# Patient Record
Sex: FEMALE | Race: WHITE | NOT HISPANIC OR LATINO | URBAN - METROPOLITAN AREA
[De-identification: names, ages, dates, MRNs, and addresses within clinical notes are randomized per-mention and may not be internally consistent; named-entity substitution may affect disease eponyms.]

---

## 2017-02-28 ENCOUNTER — APPOINTMENT (OUTPATIENT)
Dept: URBAN - METROPOLITAN AREA CLINIC 200 | Age: 67
Setting detail: DERMATOLOGY
End: 2017-03-03

## 2017-02-28 DIAGNOSIS — L57.8 OTHER SKIN CHANGES DUE TO CHRONIC EXPOSURE TO NONIONIZING RADIATION: ICD-10-CM

## 2017-02-28 DIAGNOSIS — D22 MELANOCYTIC NEVI: ICD-10-CM

## 2017-02-28 DIAGNOSIS — Z85.828 PERSONAL HISTORY OF OTHER MALIGNANT NEOPLASM OF SKIN: ICD-10-CM

## 2017-02-28 DIAGNOSIS — L57.0 ACTINIC KERATOSIS: ICD-10-CM

## 2017-02-28 PROBLEM — D22.5 MELANOCYTIC NEVI OF TRUNK: Status: ACTIVE | Noted: 2017-02-28

## 2017-02-28 PROCEDURE — 17000 DESTRUCT PREMALG LESION: CPT

## 2017-02-28 PROCEDURE — 99213 OFFICE O/P EST LOW 20 MIN: CPT | Mod: 25

## 2017-02-28 PROCEDURE — OTHER COUNSELING: OTHER

## 2017-02-28 PROCEDURE — OTHER LIQUID NITROGEN: OTHER

## 2017-02-28 PROCEDURE — OTHER MIPS QUALITY: OTHER

## 2017-02-28 PROCEDURE — 17003 DESTRUCT PREMALG LES 2-14: CPT

## 2017-02-28 ASSESSMENT — LOCATION DETAILED DESCRIPTION DERM
LOCATION DETAILED: LEFT INFERIOR MEDIAL MALAR CHEEK
LOCATION DETAILED: UPPER STERNUM
LOCATION DETAILED: LEFT SUPERIOR MEDIAL FOREHEAD
LOCATION DETAILED: LEFT MEDIAL FRONTAL SCALP
LOCATION DETAILED: RIGHT SUPERIOR PARIETAL SCALP

## 2017-02-28 ASSESSMENT — LOCATION SIMPLE DESCRIPTION DERM
LOCATION SIMPLE: LEFT CHEEK
LOCATION SIMPLE: LEFT SCALP
LOCATION SIMPLE: CHEST
LOCATION SIMPLE: LEFT FOREHEAD
LOCATION SIMPLE: SCALP

## 2017-02-28 ASSESSMENT — LOCATION ZONE DERM
LOCATION ZONE: FACE
LOCATION ZONE: SCALP
LOCATION ZONE: TRUNK

## 2017-02-28 NOTE — PROCEDURE: LIQUID NITROGEN
Detail Level: Detailed
Post-Care Instructions: I reviewed with the patient in detail post-care instructions. Patient is to wear sunprotection, and avoid picking at any of the treated lesions. Pt may apply Vaseline to crusted or scabbing areas.
Number Of Freeze-Thaw Cycles: 2 freeze-thaw cycles
Duration Of Freeze Thaw-Cycle (Seconds): 10
Render Post-Care Instructions In Note?: no
Consent: The patient's consent was obtained including but not limited to risks of crusting, scabbing, blistering, scarring, darker or lighter pigmentary change, recurrence, incomplete removal and infection.

## 2017-08-14 ENCOUNTER — APPOINTMENT (OUTPATIENT)
Dept: URBAN - METROPOLITAN AREA CLINIC 200 | Age: 67
Setting detail: DERMATOLOGY
End: 2017-08-15

## 2017-08-14 DIAGNOSIS — Z85.828 PERSONAL HISTORY OF OTHER MALIGNANT NEOPLASM OF SKIN: ICD-10-CM

## 2017-08-14 DIAGNOSIS — L57.8 OTHER SKIN CHANGES DUE TO CHRONIC EXPOSURE TO NONIONIZING RADIATION: ICD-10-CM

## 2017-08-14 DIAGNOSIS — L57.0 ACTINIC KERATOSIS: ICD-10-CM

## 2017-08-14 PROCEDURE — 17003 DESTRUCT PREMALG LES 2-14: CPT

## 2017-08-14 PROCEDURE — 99213 OFFICE O/P EST LOW 20 MIN: CPT | Mod: 25

## 2017-08-14 PROCEDURE — OTHER COUNSELING: OTHER

## 2017-08-14 PROCEDURE — 17000 DESTRUCT PREMALG LESION: CPT

## 2017-08-14 PROCEDURE — OTHER LIQUID NITROGEN: OTHER

## 2017-08-14 ASSESSMENT — LOCATION SIMPLE DESCRIPTION DERM
LOCATION SIMPLE: RIGHT CHEEK
LOCATION SIMPLE: LEFT FOREHEAD
LOCATION SIMPLE: LOWER BACK
LOCATION SIMPLE: NOSE
LOCATION SIMPLE: RIGHT FOREHEAD
LOCATION SIMPLE: LEFT CHEEK

## 2017-08-14 ASSESSMENT — LOCATION DETAILED DESCRIPTION DERM
LOCATION DETAILED: LEFT SUPERIOR NASAL CHEEK
LOCATION DETAILED: LEFT SUPERIOR LATERAL MALAR CHEEK
LOCATION DETAILED: NASAL ROOT
LOCATION DETAILED: SUPERIOR LUMBAR SPINE
LOCATION DETAILED: LEFT MEDIAL FOREHEAD
LOCATION DETAILED: RIGHT SUPERIOR LATERAL FOREHEAD
LOCATION DETAILED: RIGHT SUPERIOR LATERAL MALAR CHEEK

## 2017-08-14 ASSESSMENT — LOCATION ZONE DERM
LOCATION ZONE: TRUNK
LOCATION ZONE: FACE
LOCATION ZONE: NOSE

## 2017-08-14 NOTE — PROCEDURE: LIQUID NITROGEN
Duration Of Freeze Thaw-Cycle (Seconds): 10
Detail Level: Detailed
Render Post-Care Instructions In Note?: no
Number Of Freeze-Thaw Cycles: 2 freeze-thaw cycles
Post-Care Instructions: I reviewed with the patient in detail post-care instructions. Patient is to wear sunprotection, and avoid picking at any of the treated lesions. Pt may apply Vaseline to crusted or scabbing areas.
Consent: The patient's consent was obtained including but not limited to risks of crusting, scabbing, blistering, scarring, darker or lighter pigmentary change, recurrence, incomplete removal and infection.

## 2018-02-14 ENCOUNTER — APPOINTMENT (OUTPATIENT)
Dept: URBAN - METROPOLITAN AREA CLINIC 200 | Age: 68
Setting detail: DERMATOLOGY
End: 2018-02-15

## 2018-02-14 DIAGNOSIS — L57.8 OTHER SKIN CHANGES DUE TO CHRONIC EXPOSURE TO NONIONIZING RADIATION: ICD-10-CM

## 2018-02-14 DIAGNOSIS — D22 MELANOCYTIC NEVI: ICD-10-CM

## 2018-02-14 DIAGNOSIS — L57.0 ACTINIC KERATOSIS: ICD-10-CM

## 2018-02-14 PROBLEM — D22.5 MELANOCYTIC NEVI OF TRUNK: Status: ACTIVE | Noted: 2018-02-14

## 2018-02-14 PROCEDURE — 99213 OFFICE O/P EST LOW 20 MIN: CPT | Mod: 25

## 2018-02-14 PROCEDURE — OTHER COUNSELING: OTHER

## 2018-02-14 PROCEDURE — 17000 DESTRUCT PREMALG LESION: CPT

## 2018-02-14 PROCEDURE — 17003 DESTRUCT PREMALG LES 2-14: CPT

## 2018-02-14 PROCEDURE — OTHER LIQUID NITROGEN: OTHER

## 2018-02-14 PROCEDURE — OTHER MIPS QUALITY: OTHER

## 2018-02-14 ASSESSMENT — LOCATION ZONE DERM
LOCATION ZONE: TRUNK
LOCATION ZONE: FACE
LOCATION ZONE: LIP
LOCATION ZONE: ARM
LOCATION ZONE: SCALP

## 2018-02-14 ASSESSMENT — LOCATION DETAILED DESCRIPTION DERM
LOCATION DETAILED: LEFT SUPERIOR CENTRAL MALAR CHEEK
LOCATION DETAILED: LEFT SUPERIOR TEMPLE
LOCATION DETAILED: RIGHT UPPER CUTANEOUS LIP
LOCATION DETAILED: LEFT UPPER CUTANEOUS LIP
LOCATION DETAILED: UPPER STERNUM
LOCATION DETAILED: LEFT POSTERIOR SHOULDER
LOCATION DETAILED: RIGHT CENTRAL BUCCAL CHEEK
LOCATION DETAILED: RIGHT MEDIAL FRONTAL SCALP
LOCATION DETAILED: RIGHT CENTRAL PARIETAL SCALP
LOCATION DETAILED: RIGHT SUPERIOR PARIETAL SCALP

## 2018-02-14 ASSESSMENT — PAIN INTENSITY VAS: HOW INTENSE IS YOUR PAIN 0 BEING NO PAIN, 10 BEING THE MOST SEVERE PAIN POSSIBLE?: NO PAIN

## 2018-02-14 ASSESSMENT — LOCATION SIMPLE DESCRIPTION DERM
LOCATION SIMPLE: RIGHT LIP
LOCATION SIMPLE: SCALP
LOCATION SIMPLE: RIGHT SCALP
LOCATION SIMPLE: LEFT TEMPLE
LOCATION SIMPLE: LEFT LIP
LOCATION SIMPLE: RIGHT CHEEK
LOCATION SIMPLE: CHEST
LOCATION SIMPLE: LEFT SHOULDER
LOCATION SIMPLE: LEFT CHEEK

## 2018-08-15 ENCOUNTER — APPOINTMENT (OUTPATIENT)
Dept: URBAN - METROPOLITAN AREA CLINIC 200 | Age: 68
Setting detail: DERMATOLOGY
End: 2018-08-28

## 2018-08-15 DIAGNOSIS — Z85.828 PERSONAL HISTORY OF OTHER MALIGNANT NEOPLASM OF SKIN: ICD-10-CM

## 2018-08-15 DIAGNOSIS — L57.8 OTHER SKIN CHANGES DUE TO CHRONIC EXPOSURE TO NONIONIZING RADIATION: ICD-10-CM

## 2018-08-15 DIAGNOSIS — D485 NEOPLASM OF UNCERTAIN BEHAVIOR OF SKIN: ICD-10-CM

## 2018-08-15 DIAGNOSIS — L57.0 ACTINIC KERATOSIS: ICD-10-CM

## 2018-08-15 PROBLEM — D48.5 NEOPLASM OF UNCERTAIN BEHAVIOR OF SKIN: Status: ACTIVE | Noted: 2018-08-15

## 2018-08-15 PROBLEM — M12.9 ARTHROPATHY, UNSPECIFIED: Status: ACTIVE | Noted: 2018-08-15

## 2018-08-15 PROCEDURE — 11100: CPT | Mod: 59

## 2018-08-15 PROCEDURE — 17003 DESTRUCT PREMALG LES 2-14: CPT

## 2018-08-15 PROCEDURE — 99213 OFFICE O/P EST LOW 20 MIN: CPT | Mod: 25

## 2018-08-15 PROCEDURE — OTHER BIOPSY BY SHAVE METHOD: OTHER

## 2018-08-15 PROCEDURE — OTHER LIQUID NITROGEN: OTHER

## 2018-08-15 PROCEDURE — 17000 DESTRUCT PREMALG LESION: CPT

## 2018-08-15 PROCEDURE — OTHER COUNSELING: OTHER

## 2018-08-15 ASSESSMENT — LOCATION SIMPLE DESCRIPTION DERM
LOCATION SIMPLE: CHEST
LOCATION SIMPLE: LEFT ANTERIOR NECK
LOCATION SIMPLE: NOSE
LOCATION SIMPLE: RIGHT ZYGOMA
LOCATION SIMPLE: LEFT LIP
LOCATION SIMPLE: LEFT ZYGOMA
LOCATION SIMPLE: RIGHT ANTERIOR NECK
LOCATION SIMPLE: LEFT FOREHEAD
LOCATION SIMPLE: RIGHT FOREHEAD
LOCATION SIMPLE: ABDOMEN

## 2018-08-15 ASSESSMENT — LOCATION DETAILED DESCRIPTION DERM
LOCATION DETAILED: PERIUMBILICAL SKIN
LOCATION DETAILED: LEFT CENTRAL ZYGOMA
LOCATION DETAILED: LEFT SUPERIOR FOREHEAD
LOCATION DETAILED: RIGHT NASAL DORSUM
LOCATION DETAILED: LEFT MEDIAL FOREHEAD
LOCATION DETAILED: LEFT INFERIOR FOREHEAD
LOCATION DETAILED: LEFT SUPERIOR MEDIAL FOREHEAD
LOCATION DETAILED: RIGHT INFERIOR ANTERIOR NECK
LOCATION DETAILED: LEFT INFERIOR ANTERIOR NECK
LOCATION DETAILED: RIGHT MEDIAL ZYGOMA
LOCATION DETAILED: LEFT UPPER CUTANEOUS LIP
LOCATION DETAILED: RIGHT SUPERIOR MEDIAL FOREHEAD
LOCATION DETAILED: LEFT MEDIAL SUPERIOR CHEST

## 2018-08-15 ASSESSMENT — LOCATION ZONE DERM
LOCATION ZONE: NECK
LOCATION ZONE: TRUNK
LOCATION ZONE: LIP
LOCATION ZONE: NOSE
LOCATION ZONE: FACE

## 2018-08-15 ASSESSMENT — PAIN INTENSITY VAS: HOW INTENSE IS YOUR PAIN 0 BEING NO PAIN, 10 BEING THE MOST SEVERE PAIN POSSIBLE?: NO PAIN

## 2018-08-15 NOTE — PROCEDURE: BIOPSY BY SHAVE METHOD
X Size Of Lesion In Cm: 0
Cryotherapy Text: The wound bed was treated with cryotherapy after the biopsy was performed.
Was A Bandage Applied: Yes
Billing Type: Third-Party Bill
Anesthesia Type: 1% lidocaine with epinephrine
Post-Care Instructions: I reviewed with the patient in detail post-care instructions. Patient is to keep the biopsy site dry overnight, and then apply bacitracin twice daily until healed. Patient may apply hydrogen peroxide soaks to remove any crusting.
Anesthesia Volume In Cc (Will Not Render If 0): 0.5
Biopsy Type: H and E
Bill 07225 For Specimen Handling/Conveyance To Laboratory?: no
Type Of Destruction Used: Curettage
Depth Of Biopsy: dermis
Hemostasis: Drysol
Consent: Written consent was obtained and risks were reviewed including but not limited to scarring, infection, bleeding, scabbing, incomplete removal, nerve damage and allergy to anesthesia.
Detail Level: Detailed
Notification Instructions: Patient will be notified of biopsy results. However, patient instructed to call the office if not contacted within 2 weeks.
Wound Care: Petrolatum
Dressing: bandage
Biopsy Method: Dermablade
Electrodesiccation And Curettage Text: The wound bed was treated with electrodesiccation and curettage after the biopsy was performed.
Curettage Text: The wound bed was treated with curettage after the biopsy was performed.
Silver Nitrate Text: The wound bed was treated with silver nitrate after the biopsy was performed.
Electrodesiccation Text: The wound bed was treated with electrodesiccation after the biopsy was performed.

## 2018-08-28 ENCOUNTER — APPOINTMENT (OUTPATIENT)
Dept: URBAN - METROPOLITAN AREA CLINIC 200 | Age: 68
Setting detail: DERMATOLOGY
End: 2018-09-11

## 2018-08-28 DIAGNOSIS — L57.0 ACTINIC KERATOSIS: ICD-10-CM

## 2018-08-28 PROCEDURE — OTHER LIQUID NITROGEN: OTHER

## 2018-08-28 PROCEDURE — 17000 DESTRUCT PREMALG LESION: CPT

## 2018-08-28 ASSESSMENT — LOCATION DETAILED DESCRIPTION DERM: LOCATION DETAILED: LEFT UPPER CUTANEOUS LIP

## 2018-08-28 ASSESSMENT — LOCATION SIMPLE DESCRIPTION DERM: LOCATION SIMPLE: LEFT LIP

## 2018-08-28 ASSESSMENT — PAIN INTENSITY VAS: HOW INTENSE IS YOUR PAIN 0 BEING NO PAIN, 10 BEING THE MOST SEVERE PAIN POSSIBLE?: NO PAIN

## 2018-08-28 ASSESSMENT — LOCATION ZONE DERM: LOCATION ZONE: LIP

## 2018-10-23 ENCOUNTER — CLINICAL SUPPORT (OUTPATIENT)
Dept: PRIMARY CARE | Facility: CLINIC | Age: 68
End: 2018-10-23
Payer: MEDICARE

## 2018-10-23 DIAGNOSIS — Z23 NEED FOR INFLUENZA VACCINATION: Primary | ICD-10-CM

## 2018-10-23 PROCEDURE — 90653 IIV ADJUVANT VACCINE IM: CPT | Performed by: INTERNAL MEDICINE

## 2018-10-23 PROCEDURE — G0008 ADMIN INFLUENZA VIRUS VAC: HCPCS | Performed by: INTERNAL MEDICINE

## 2019-03-21 ENCOUNTER — APPOINTMENT (OUTPATIENT)
Dept: URBAN - METROPOLITAN AREA CLINIC 200 | Age: 69
Setting detail: DERMATOLOGY
End: 2019-03-21

## 2019-03-21 DIAGNOSIS — L57.8 OTHER SKIN CHANGES DUE TO CHRONIC EXPOSURE TO NONIONIZING RADIATION: ICD-10-CM

## 2019-03-21 DIAGNOSIS — L57.0 ACTINIC KERATOSIS: ICD-10-CM

## 2019-03-21 DIAGNOSIS — L92.3 FOREIGN BODY GRANULOMA OF THE SKIN AND SUBCUTANEOUS TISSUE: ICD-10-CM

## 2019-03-21 DIAGNOSIS — Z85.828 PERSONAL HISTORY OF OTHER MALIGNANT NEOPLASM OF SKIN: ICD-10-CM

## 2019-03-21 PROCEDURE — 17000 DESTRUCT PREMALG LESION: CPT

## 2019-03-21 PROCEDURE — OTHER MIPS QUALITY: OTHER

## 2019-03-21 PROCEDURE — 10120 INC&RMVL FB SUBQ TISS SMPL: CPT

## 2019-03-21 PROCEDURE — 99213 OFFICE O/P EST LOW 20 MIN: CPT | Mod: 25

## 2019-03-21 PROCEDURE — OTHER LIQUID NITROGEN: OTHER

## 2019-03-21 PROCEDURE — OTHER FOREIGN BODY REMOVAL: OTHER

## 2019-03-21 PROCEDURE — OTHER COUNSELING: OTHER

## 2019-03-21 ASSESSMENT — LOCATION ZONE DERM
LOCATION ZONE: SCALP
LOCATION ZONE: NECK
LOCATION ZONE: TRUNK
LOCATION ZONE: FACE

## 2019-03-21 ASSESSMENT — LOCATION SIMPLE DESCRIPTION DERM
LOCATION SIMPLE: LEFT CHEEK
LOCATION SIMPLE: LEFT ANTERIOR NECK
LOCATION SIMPLE: RIGHT FOREHEAD
LOCATION SIMPLE: CHEST
LOCATION SIMPLE: LEFT SCALP

## 2019-03-21 ASSESSMENT — LOCATION DETAILED DESCRIPTION DERM
LOCATION DETAILED: LEFT INFERIOR CENTRAL MALAR CHEEK
LOCATION DETAILED: LEFT MEDIAL SUPERIOR CHEST
LOCATION DETAILED: LEFT INFERIOR ANTERIOR NECK
LOCATION DETAILED: RIGHT INFERIOR MEDIAL FOREHEAD
LOCATION DETAILED: LEFT MEDIAL FRONTAL SCALP

## 2019-03-21 NOTE — PROCEDURE: COUNSELING
Detail Level: Generalized
Patient Specific Counseling (Will Not Stick From Patient To Patient): Discussed Juan Manuel-U and Fluorouracil
Detail Level: Zone

## 2019-03-21 NOTE — PROCEDURE: MIPS QUALITY
Quality 111:Pneumonia Vaccination Status For Older Adults: Pneumococcal Vaccination Previously Received
Quality 474: Zoster Vaccination Status: Shingrix Vaccination not Administered or Previously Received, Reason not Otherwise Specified
Detail Level: Detailed
Quality 110: Preventive Care And Screening: Influenza Immunization: Influenza Immunization previously received during influenza season

## 2019-03-25 ENCOUNTER — OFFICE VISIT (OUTPATIENT)
Dept: PRIMARY CARE | Facility: CLINIC | Age: 69
End: 2019-03-25
Payer: MEDICARE

## 2019-03-25 VITALS
DIASTOLIC BLOOD PRESSURE: 70 MMHG | HEART RATE: 71 BPM | BODY MASS INDEX: 21.92 KG/M2 | OXYGEN SATURATION: 89 % | HEIGHT: 69 IN | SYSTOLIC BLOOD PRESSURE: 120 MMHG | WEIGHT: 148 LBS

## 2019-03-25 DIAGNOSIS — R23.2 HOT FLASHES: Primary | ICD-10-CM

## 2019-03-25 DIAGNOSIS — M54.50 CHRONIC MIDLINE LOW BACK PAIN WITHOUT SCIATICA: ICD-10-CM

## 2019-03-25 DIAGNOSIS — R53.83 FATIGUE, UNSPECIFIED TYPE: ICD-10-CM

## 2019-03-25 DIAGNOSIS — G89.29 CHRONIC MIDLINE LOW BACK PAIN WITHOUT SCIATICA: ICD-10-CM

## 2019-03-25 PROCEDURE — 99214 OFFICE O/P EST MOD 30 MIN: CPT | Performed by: INTERNAL MEDICINE

## 2019-03-25 RX ORDER — NAPROXEN 500 MG/1
500 TABLET ORAL 2 TIMES DAILY WITH MEALS
COMMUNITY
End: 2021-11-07 | Stop reason: HOSPADM

## 2019-03-25 RX ORDER — OMEPRAZOLE 20 MG/1
CAPSULE, DELAYED RELEASE ORAL
Refills: 12 | COMMUNITY
Start: 2019-02-24

## 2019-03-25 RX ORDER — VALACYCLOVIR HYDROCHLORIDE 500 MG/1
TABLET, FILM COATED ORAL AS NEEDED
COMMUNITY
Start: 2019-03-20

## 2019-03-25 NOTE — PROGRESS NOTES
[facility]  Dr. Maxine Lopez  [address], [address]  [place] [address]  Phone: [phone]  Fax: [phone]      Patient ID: Windy Kitchen                              : 1950    Visit Date: 3/25/2019    Chief Complaint: Hot Flashes      HPI  Thinking about doctors when she is older and this office is too far to come.  In [facility].  Here complaining of hot flashes. 6 to 8 months ago it started. Exactly like menopause. This wears her out. Sleep is affected. Otherwise feels fine.   Does not think her new environment can explain it.        Subjective      Patient ID: Windy Kitchen is a 68 y.o. female.    Patient Active Problem List   Diagnosis   • Chronic kidney disease, stage II (mild)   • Urinary hesitancy   • Low back pain   • Menopause   • Osteoarthritis   • Basal cell carcinoma of skin   • Spondylolisthesis, lumbosacral region   • Greater trochanteric bursitis of left hip   • Fatigue   • Hot flashes         Current Outpatient Prescriptions:   •  naproxen (NAPROSYN) 500 mg tablet, Take 500 mg by mouth 2 (two) times a day with meals., Disp: , Rfl:   •  omeprazole (PriLOSEC) 20 mg capsule, Take by mouth once daily., Disp: , Rfl: 12  •  valACYclovir (VALTREX) 500 mg tablet, , Disp: , Rfl:     Allergies   Allergen Reactions   • Penicillins Hives       Social History     Social History   • Marital status:      Spouse name: N/A   • Number of children: N/A   • Years of education: N/A     Occupational History   • Not on file.     Social History Main Topics   • Smoking status: Never Smoker   • Smokeless tobacco: Never Used   • Alcohol use Not on file   • Drug use: Unknown   • Sexual activity: Not on file     Other Topics Concern   • Not on file     Social History Narrative   • No narrative on file       Health Maintenance   Topic Date Due   • DTaP, Tdap, and Td Vaccines (1 - Tdap) 1967   • Zoster Vaccine (1 of 2) 1998   • Medicare Annual  "Wellness Visit  06/25/2013   • Annual Falls Risk Screening  06/25/2013   • DEXA Scan  08/02/2018   • Hepatitis C Screening  03/26/2020 (Originally 1950)   • Mammogram  08/20/2020   • Colonoscopy  05/21/2025   • HPV Vaccines  Aged Out   • Meningococcal Vaccine  Aged Out   • HIB Vaccines  Aged Out   • IPV Vaccines  Aged Out   • Influenza Vaccine  Completed   • Pneumococcal 65+ Years/ High and Highest Risk  Completed       Past Medical History:   Diagnosis Date   • Basal cell carcinoma of skin    • Chronic kidney disease, stage II (mild)    • Greater trochanteric bursitis of left hip    • Low back pain    • Menopause    • Osteoarthritis    • Spondylolisthesis, lumbosacral region    • Urinary hesitancy        The following have been reviewed and updated as appropriate in this visit:  Allergies  Meds  Problems         Review of System  Review of Systems   Constitutional: Positive for fatigue. Negative for activity change, chills and unexpected weight change.   HENT: Negative for ear pain and hearing loss.    Respiratory: Negative for cough, shortness of breath and wheezing.    Cardiovascular: Negative for chest pain and palpitations.   Endocrine: Negative for polyuria.   Genitourinary: Negative for difficulty urinating, frequency and urgency.   Musculoskeletal: Positive for back pain.       Objective     Vitals  Vitals:    03/25/19 1116   BP: 120/70   Pulse: 71   SpO2: (!) 89%   Weight: 67.1 kg (148 lb)   Height: 1.74 m (5' 8.5\")       Body mass index is 22.18 kg/m².    Physical Exam  Physical Exam   Constitutional: She is oriented to person, place, and time. She appears well-developed and well-nourished. No distress.   HENT:   Head: Normocephalic.   Nose: Nose normal.   Mouth/Throat: Oropharynx is clear and moist.   Eyes: Conjunctivae and EOM are normal. Pupils are equal, round, and reactive to light.   Neck: Normal range of motion. Neck supple. No thyromegaly present.   Cardiovascular: Normal rate, regular " rhythm and normal heart sounds.  Exam reveals no gallop.    No murmur heard.  Pulmonary/Chest: Effort normal and breath sounds normal. She has no wheezes. She has no rales.   Abdominal: Soft. Bowel sounds are normal. She exhibits no mass. There is no tenderness.   Musculoskeletal: Normal range of motion. She exhibits deformity. She exhibits no edema.   Neurological: She is alert and oriented to person, place, and time. Coordination normal.   Skin: Skin is warm and dry.   Vitals reviewed.      Assessment/Plan     Problem List Items Addressed This Visit     Low back pain    Relevant Orders    Ambulatory referral to Pain Medicine    Fatigue    Relevant Orders    TSH    Hot flashes - Primary    Relevant Orders    Comprehensive metabolic panel    CBC and differential    TSH    Urinalysis with Reflex Culture    Sedimentation rate, automated          Patient Instructions   Try OTC products.  Remifemin  Yam cream  Primrose oil.  Consider gynecologist visit.  Consider glyoxide for aphthous ulcers.      Return in about 6 months (around 9/25/2019), or if symptoms worsen or fail to improve.      Maxine Lopez MD  3/27/2019

## 2019-03-25 NOTE — PATIENT INSTRUCTIONS
Try OTC products.  Remifemin  Yam cream  Primrose oil.  Consider gynecologist visit.  Consider glyoxide for aphthous ulcers.

## 2019-03-27 PROBLEM — R53.83 FATIGUE: Status: ACTIVE | Noted: 2019-03-27

## 2019-03-27 PROBLEM — R23.2 HOT FLASHES: Status: ACTIVE | Noted: 2019-03-27

## 2019-03-27 ASSESSMENT — ENCOUNTER SYMPTOMS
FATIGUE: 1
PALPITATIONS: 0
UNEXPECTED WEIGHT CHANGE: 0
WHEEZING: 0
BACK PAIN: 1
COUGH: 0
SHORTNESS OF BREATH: 0
CHILLS: 0
ACTIVITY CHANGE: 0
DIFFICULTY URINATING: 0
FREQUENCY: 0

## 2019-04-02 LAB
ALBUMIN SERPL-MCNC: 4.6 G/DL (ref 3.6–5.1)
ALBUMIN/GLOB SERPL: 1.9 (CALC) (ref 1–2.5)
ALP SERPL-CCNC: 76 U/L (ref 33–130)
ALT SERPL-CCNC: 18 U/L (ref 6–29)
APPEARANCE UR: CLEAR
AST SERPL-CCNC: 20 U/L (ref 10–35)
BACTERIA #/AREA URNS HPF: NORMAL /HPF
BACTERIA UR CULT: NORMAL
BASOPHILS # BLD AUTO: 42 CELLS/UL (ref 0–200)
BASOPHILS NFR BLD AUTO: 0.5 %
BILIRUB SERPL-MCNC: 0.5 MG/DL (ref 0.2–1.2)
BILIRUB UR QL STRIP: NEGATIVE
BUN SERPL-MCNC: 21 MG/DL (ref 7–25)
BUN/CREAT SERPL: NORMAL (CALC) (ref 6–22)
CALCIUM SERPL-MCNC: 9.8 MG/DL (ref 8.6–10.4)
CHLORIDE SERPL-SCNC: 105 MMOL/L (ref 98–110)
CO2 SERPL-SCNC: 28 MMOL/L (ref 20–32)
COLOR UR: YELLOW
CREAT SERPL-MCNC: 0.93 MG/DL (ref 0.6–0.93)
EOSINOPHIL # BLD AUTO: 100 CELLS/UL (ref 15–500)
EOSINOPHIL NFR BLD AUTO: 1.2 %
ERYTHROCYTE [DISTWIDTH] IN BLOOD BY AUTOMATED COUNT: 12.1 % (ref 11–15)
ERYTHROCYTE [SEDIMENTATION RATE] IN BLOOD BY WESTERGREN METHOD: 2 MM/H
GLOBULIN SER CALC-MCNC: 2.4 G/DL (CALC) (ref 1.9–3.7)
GLUCOSE SERPL-MCNC: 88 MG/DL (ref 65–99)
GLUCOSE UR QL STRIP: NEGATIVE
HCT VFR BLD AUTO: 42.5 % (ref 35–45)
HGB BLD-MCNC: 14.5 G/DL (ref 11.7–15.5)
HGB UR QL STRIP: NEGATIVE
HYALINE CASTS #/AREA URNS LPF: NORMAL /LPF
KETONES UR QL STRIP: NEGATIVE
LEUKOCYTE ESTERASE UR QL STRIP: NEGATIVE
LYMPHOCYTES # BLD AUTO: 2656 CELLS/UL (ref 850–3900)
LYMPHOCYTES NFR BLD AUTO: 32 %
MCH RBC QN AUTO: 34.4 PG (ref 27–33)
MCHC RBC AUTO-ENTMCNC: 34.1 G/DL (ref 32–36)
MCV RBC AUTO: 101 FL (ref 80–100)
MONOCYTES # BLD AUTO: 764 CELLS/UL (ref 200–950)
MONOCYTES NFR BLD AUTO: 9.2 %
NEUTROPHILS # BLD AUTO: 4739 CELLS/UL (ref 1500–7800)
NEUTROPHILS NFR BLD AUTO: 57.1 %
NITRITE UR QL STRIP: NEGATIVE
PH UR STRIP: 5.5 [PH] (ref 5–8)
PLATELET # BLD AUTO: 234 THOUSAND/UL (ref 140–400)
PMV BLD REES-ECKER: 10.5 FL (ref 7.5–12.5)
POTASSIUM SERPL-SCNC: 4 MMOL/L (ref 3.5–5.3)
PROT SERPL-MCNC: 7 G/DL (ref 6.1–8.1)
PROT UR QL STRIP: NEGATIVE
QUEST EGFR NON-AFR. AMERICAN: 62 ML/MIN/1.73M2
RBC # BLD AUTO: 4.21 MILLION/UL (ref 3.8–5.1)
RBC #/AREA URNS HPF: NORMAL /HPF
SODIUM SERPL-SCNC: 141 MMOL/L (ref 135–146)
SP GR UR STRIP: 1.01 (ref 1–1.03)
SQUAMOUS #/AREA URNS HPF: NORMAL /HPF
TSH SERPL-ACNC: 3.68 MIU/L (ref 0.4–4.5)
WBC # BLD AUTO: 8.3 THOUSAND/UL (ref 3.8–10.8)
WBC #/AREA URNS HPF: NORMAL /HPF

## 2019-04-05 ENCOUNTER — TELEPHONE (OUTPATIENT)
Dept: PRIMARY CARE | Facility: CLINIC | Age: 69
End: 2019-04-05

## 2019-04-05 NOTE — TELEPHONE ENCOUNTER
----- Message from Maxine Lopez MD sent at 4/5/2019  7:56 AM EDT -----  Thought replied to already. The results are normal. Please advise the pt if not already done. Thx

## 2019-06-04 ENCOUNTER — TELEPHONE (OUTPATIENT)
Dept: PRIMARY CARE | Facility: CLINIC | Age: 69
End: 2019-06-04

## 2019-06-04 NOTE — TELEPHONE ENCOUNTER
Wants to have a hearing test. Needs an order from Dr GERMAIN to go to [facility].  Fax order to # [phone]

## 2019-06-25 ENCOUNTER — TELEPHONE (OUTPATIENT)
Dept: PRIMARY CARE | Facility: CLINIC | Age: 69
End: 2019-06-25

## 2019-06-25 NOTE — TELEPHONE ENCOUNTER
Pt seen at urgent care and given zpak  She has been on zpak 3 days will give it more time and try Flonase and call if no improvement

## 2019-06-25 NOTE — TELEPHONE ENCOUNTER
Asking to speak to a PA. Ques about ear pain & meds. Started zpak on Sunday & is still having ear pressure & discomfort.

## 2019-07-01 ENCOUNTER — OFFICE VISIT (OUTPATIENT)
Dept: PRIMARY CARE | Facility: CLINIC | Age: 69
End: 2019-07-01
Payer: MEDICARE

## 2019-07-01 VITALS
DIASTOLIC BLOOD PRESSURE: 70 MMHG | HEART RATE: 70 BPM | WEIGHT: 148 LBS | BODY MASS INDEX: 21.92 KG/M2 | OXYGEN SATURATION: 98 % | HEIGHT: 69 IN | SYSTOLIC BLOOD PRESSURE: 110 MMHG

## 2019-07-01 DIAGNOSIS — H69.93 EUSTACHIAN TUBE DYSFUNCTION, BILATERAL: Primary | ICD-10-CM

## 2019-07-01 DIAGNOSIS — H93.8X3 EAR FULLNESS, BILATERAL: ICD-10-CM

## 2019-07-01 DIAGNOSIS — J02.9 ACUTE SORE THROAT: ICD-10-CM

## 2019-07-01 PROCEDURE — 99213 OFFICE O/P EST LOW 20 MIN: CPT | Performed by: INTERNAL MEDICINE

## 2019-07-01 RX ORDER — AZELASTINE HCL 205.5 UG/1
1 SPRAY NASAL 2 TIMES DAILY
Qty: 30 ML | Refills: 5 | Status: SHIPPED | OUTPATIENT
Start: 2019-07-01 | End: 2019-07-31

## 2019-07-01 NOTE — PROGRESS NOTES
"[facility]  Dr. Heena Ramirez  [address], [address]  [place] [address]  Phone: [phone]  Fax: [phone]      Patient ID: Windy Kitchen                              : 1950    Visit Date: 19    Chief Complaint: Earache / Otalgia (Pt diagnosed with ear infections at  about 10 days ago, Zpak completed, ears are still clogged and trying to \"pop\" but unsuccessful)      Patient ID: Windy Kitchen is a 69 y.o. female.    HPI  Started with cold symptoms about 10 - 12 days ago with sore throat, fatigue, head congestion  Took a z yue starting 19  Tried Mucinex and got only little relief  Then used nasal spray and cough medication - dextromethorphan  Improved but ears right > left still not back to normal    [employer] just retired -  specializing in horses      Earache / Otalgia    There is pain in both (started on right byt now both) ears. This is a new problem. The current episode started 1 to 4 weeks ago. The problem occurs constantly. The problem has been waxing and waning. There has been no fever. The pain is mild (more pressure than pain now - has had some pain). Associated symptoms include coughing (noproductive), hearing loss, neck pain (right - a little), rhinorrhea and a sore throat (worse on the right). Pertinent negatives include no diarrhea, ear discharge, headaches (was present and mild) or rash. Associated symptoms comments: Still plugged. She has tried antibiotics (z yue 19 - see above) for the symptoms. The treatment provided moderate relief. There is no history of a chronic ear infection, hearing loss or a tympanostomy tube.         Patient Active Problem List   Diagnosis   • Chronic kidney disease, stage II (mild)   • Urinary hesitancy   • Low back pain   • Menopause   • Osteoarthritis   • Basal cell carcinoma of skin   • Spondylolisthesis, lumbosacral region   • Greater trochanteric bursitis of left hip   • Fatigue "   • Hot flashes   • Eustachian tube dysfunction, bilateral   • Ear fullness, bilateral   • Acute sore throat         Current Outpatient Prescriptions:   •  naproxen (NAPROSYN) 500 mg tablet, Take 500 mg by mouth 2 (two) times a day with meals., Disp: , Rfl:   •  omeprazole (PriLOSEC) 20 mg capsule, Take by mouth once daily., Disp: , Rfl: 12  •  valACYclovir (VALTREX) 500 mg tablet, , Disp: , Rfl:   •  azelastine 0.15 % (205.5 mcg) nasal spray, Administer 1 spray into each nostril 2 (two) times a day., Disp: 30 mL, Rfl: 5    Allergies   Allergen Reactions   • Penicillins Hives       Social History     Social History   • Marital status:      Spouse name: N/A   • Number of children: N/A   • Years of education: N/A     Occupational History   • Not on file.     Social History Main Topics   • Smoking status: Never Smoker   • Smokeless tobacco: Never Used   • Alcohol use Not on file   • Drug use: Unknown   • Sexual activity: Not on file     Other Topics Concern   • Not on file     Social History Narrative   • No narrative on file       Health Maintenance   Topic Date Due   • DTaP, Tdap, and Td Vaccines (1 - Tdap) 06/25/1967   • Zoster Vaccine (1 of 2) 06/25/1998   • Medicare Annual Wellness Visit  06/25/2013   • Annual Falls Risk Screening  06/25/2013   • DEXA Scan  08/02/2018   • Hepatitis C Screening  03/26/2020 (Originally 1950)   • Influenza Vaccine (1) 08/01/2019   • Mammogram  08/20/2020   • Colonoscopy  05/21/2025   • Meningococcal ACWY  Aged Out   • Varicella Vaccines  Aged Out   • HIB Vaccines  Aged Out   • IPV Vaccines  Aged Out   • HPV Vaccines  Aged Out   • Pneumococcal  Aged Out   • Pneumococcal (65 years and older)  Completed       Past Medical History:   Diagnosis Date   • Basal cell carcinoma of skin    • Chronic kidney disease, stage II (mild)    • Greater trochanteric bursitis of left hip    • Low back pain    • Menopause    • Osteoarthritis    • Spondylolisthesis, lumbosacral region    •  "Urinary hesitancy        The following have been reviewed and updated as appropriate in this visit:  Allergies  Meds  Problems         Review of System  Review of Systems   HENT: Positive for ear pain, hearing loss, rhinorrhea and sore throat (worse on the right). Negative for ear discharge.    Respiratory: Positive for cough (noproductive).    Gastrointestinal: Negative for diarrhea.   Musculoskeletal: Positive for neck pain (right - a little).   Skin: Negative for rash.   Neurological: Negative for headaches (was present and mild).       Objective     Vitals  Vitals:    07/01/19 1145   BP: 110/70   BP Location: Right upper arm   Patient Position: Sitting   Pulse: 70   SpO2: 98%   Weight: 67.1 kg (148 lb)   Height: 1.74 m (5' 8.5\")       Body mass index is 22.18 kg/m².    Physical Exam  Physical Exam   Constitutional: She appears well-nourished.   HENT:   Head: Normocephalic.   Right Ear: Hearing, external ear and ear canal normal. Tympanic membrane is not erythematous. A middle ear effusion is present.   Left Ear: Hearing, external ear and ear canal normal. Tympanic membrane is not erythematous. A middle ear effusion is present.   Nose: Mucosal edema and rhinorrhea present.   Mouth/Throat: Uvula is midline and mucous membranes are normal. Posterior oropharyngeal erythema (mild posterior tonsillar pillars) present. No oropharyngeal exudate.   Neck: Normal range of motion. Neck supple.   Cardiovascular: Normal rate, regular rhythm and normal heart sounds.    Pulmonary/Chest: Effort normal and breath sounds normal.   Lymphadenopathy:     She has cervical adenopathy (right freely movable single LN - tender).   Skin: Skin is warm and dry. No pallor.   Psychiatric: She has a normal mood and affect.   Vitals reviewed.      Assessment/Plan     Problem List Items Addressed This Visit        Nervous    Eustachian tube dysfunction, bilateral - Primary     Continue steroid nasal spray using now and increase to BID for 3 " days  Start azelastine and use twice daily as directed  If not improving after  - 10 days, consider medrol dose pack         Relevant Medications    azelastine 0.15 % (205.5 mcg) nasal spray    Ear fullness, bilateral     Continue OTC decongestants  Stay well hydrated         Relevant Medications    azelastine 0.15 % (205.5 mcg) nasal spray    Acute sore throat     No evidence of bacterial involvement now  Can take ibuprofen and lozenges as needed.  Hydrate                  Return if symptoms worsen or fail to improve.      Heena Ramirez MD  7/3/2019

## 2019-07-03 PROBLEM — H69.93 EUSTACHIAN TUBE DYSFUNCTION, BILATERAL: Status: ACTIVE | Noted: 2019-07-03

## 2019-07-03 PROBLEM — J02.9 ACUTE SORE THROAT: Status: ACTIVE | Noted: 2019-07-03

## 2019-07-03 PROBLEM — H93.8X3 EAR FULLNESS, BILATERAL: Status: ACTIVE | Noted: 2019-07-03

## 2019-07-03 ASSESSMENT — ENCOUNTER SYMPTOMS
HEADACHES: 0
COUGH: 1
SORE THROAT: 1
NECK PAIN: 1
DIARRHEA: 0
RHINORRHEA: 1

## 2019-07-03 NOTE — ASSESSMENT & PLAN NOTE
Continue steroid nasal spray using now and increase to BID for 3 days  Start azelastine and use twice daily as directed  If not improving after  - 10 days, consider medrol dose pack

## 2019-09-11 ENCOUNTER — CLINICAL SUPPORT (OUTPATIENT)
Dept: PRIMARY CARE | Facility: CLINIC | Age: 69
End: 2019-09-11
Payer: MEDICARE

## 2019-09-11 DIAGNOSIS — Z23 NEED FOR IMMUNIZATION AGAINST INFLUENZA: Primary | ICD-10-CM

## 2019-09-11 PROCEDURE — 90686 IIV4 VACC NO PRSV 0.5 ML IM: CPT | Performed by: INTERNAL MEDICINE

## 2019-09-11 PROCEDURE — G0008 ADMIN INFLUENZA VIRUS VAC: HCPCS | Performed by: INTERNAL MEDICINE

## 2019-09-23 ENCOUNTER — APPOINTMENT (OUTPATIENT)
Dept: URBAN - METROPOLITAN AREA CLINIC 200 | Age: 69
Setting detail: DERMATOLOGY
End: 2019-09-27

## 2019-09-23 DIAGNOSIS — L57.0 ACTINIC KERATOSIS: ICD-10-CM

## 2019-09-23 DIAGNOSIS — L57.8 OTHER SKIN CHANGES DUE TO CHRONIC EXPOSURE TO NONIONIZING RADIATION: ICD-10-CM

## 2019-09-23 DIAGNOSIS — Z85.828 PERSONAL HISTORY OF OTHER MALIGNANT NEOPLASM OF SKIN: ICD-10-CM

## 2019-09-23 PROCEDURE — 99213 OFFICE O/P EST LOW 20 MIN: CPT | Mod: 25

## 2019-09-23 PROCEDURE — OTHER COUNSELING: OTHER

## 2019-09-23 PROCEDURE — 17000 DESTRUCT PREMALG LESION: CPT

## 2019-09-23 PROCEDURE — OTHER MIPS QUALITY: OTHER

## 2019-09-23 PROCEDURE — OTHER LIQUID NITROGEN: OTHER

## 2019-09-23 ASSESSMENT — LOCATION DETAILED DESCRIPTION DERM
LOCATION DETAILED: LEFT INFERIOR ANTERIOR NECK
LOCATION DETAILED: RIGHT SUPERIOR UPPER BACK
LOCATION DETAILED: LEFT MEDIAL SUPERIOR CHEST

## 2019-09-23 ASSESSMENT — LOCATION SIMPLE DESCRIPTION DERM
LOCATION SIMPLE: RIGHT UPPER BACK
LOCATION SIMPLE: CHEST
LOCATION SIMPLE: LEFT ANTERIOR NECK

## 2019-09-23 ASSESSMENT — LOCATION ZONE DERM
LOCATION ZONE: TRUNK
LOCATION ZONE: NECK

## 2019-09-23 NOTE — PROCEDURE: LIQUID NITROGEN
Detail Level: Detailed
Post-Care Instructions: I reviewed with the patient in detail post-care instructions. Patient is to wear sunprotection, and avoid picking at any of the treated lesions. Pt may apply Vaseline to crusted or scabbing areas.
Number Of Freeze-Thaw Cycles: 1 freeze-thaw cycle
Duration Of Freeze Thaw-Cycle (Seconds): 2
Render Note In Bullet Format When Appropriate: No
Consent: The patient's consent was obtained including but not limited to risks of crusting, scabbing, blistering, scarring, darker or lighter pigmentary change, recurrence, incomplete removal and infection.
Aperture Size (Optional): B

## 2019-11-04 ENCOUNTER — APPOINTMENT (OUTPATIENT)
Dept: URBAN - METROPOLITAN AREA CLINIC 200 | Age: 69
Setting detail: DERMATOLOGY
End: 2019-11-06

## 2019-11-04 DIAGNOSIS — L57.0 ACTINIC KERATOSIS: ICD-10-CM

## 2019-11-04 PROCEDURE — 96573 PDT DSTR PRMLG LES PHYS/QHP: CPT

## 2019-11-04 PROCEDURE — OTHER PDT: BLUE: OTHER

## 2019-11-04 ASSESSMENT — LOCATION DETAILED DESCRIPTION DERM: LOCATION DETAILED: LEFT CENTRAL MALAR CHEEK

## 2019-11-04 ASSESSMENT — PAIN INTENSITY VAS: HOW INTENSE IS YOUR PAIN 0 BEING NO PAIN, 10 BEING THE MOST SEVERE PAIN POSSIBLE?: NO PAIN

## 2019-11-04 ASSESSMENT — LOCATION SIMPLE DESCRIPTION DERM: LOCATION SIMPLE: LEFT CHEEK

## 2019-11-04 ASSESSMENT — LOCATION ZONE DERM: LOCATION ZONE: FACE

## 2019-11-04 NOTE — PROCEDURE: PDT: BLUE
Who Performed The Pdt?: Performed by MD ROXANE, ROSA MARIA or NP ([id number]) Who Performed The Pdt?: Performed by MD ROXANE, ROSA MARIA or NP ([id number])

## 2019-12-03 NOTE — PROCEDURE: LIQUID NITROGEN
Duration Of Freeze Thaw-Cycle (Seconds): 3
Render Post-Care Instructions In Note?: no
Post-Care Instructions: I reviewed with the patient in detail post-care instructions. Patient is to wear sunprotection, and avoid picking at any of the treated lesions. Pt may apply Vaseline to crusted or scabbing areas.
Consent: The patient's consent was obtained including but not limited to risks of crusting, scabbing, blistering, scarring, darker or lighter pigmentary change, recurrence, incomplete removal and infection.
Detail Level: Detailed
0 = independent

## 2020-03-18 ENCOUNTER — APPOINTMENT (OUTPATIENT)
Dept: URBAN - METROPOLITAN AREA CLINIC 200 | Age: 70
Setting detail: DERMATOLOGY
End: 2020-03-24

## 2020-03-18 DIAGNOSIS — D485 NEOPLASM OF UNCERTAIN BEHAVIOR OF SKIN: ICD-10-CM

## 2020-03-18 DIAGNOSIS — L57.8 OTHER SKIN CHANGES DUE TO CHRONIC EXPOSURE TO NONIONIZING RADIATION: ICD-10-CM

## 2020-03-18 DIAGNOSIS — L57.0 ACTINIC KERATOSIS: ICD-10-CM

## 2020-03-18 DIAGNOSIS — Z85.828 PERSONAL HISTORY OF OTHER MALIGNANT NEOPLASM OF SKIN: ICD-10-CM

## 2020-03-18 PROBLEM — D48.5 NEOPLASM OF UNCERTAIN BEHAVIOR OF SKIN: Status: ACTIVE | Noted: 2020-03-18

## 2020-03-18 PROCEDURE — 17003 DESTRUCT PREMALG LES 2-14: CPT

## 2020-03-18 PROCEDURE — OTHER MIPS QUALITY: OTHER

## 2020-03-18 PROCEDURE — OTHER BIOPSY BY SHAVE METHOD: OTHER

## 2020-03-18 PROCEDURE — 11102 TANGNTL BX SKIN SINGLE LES: CPT

## 2020-03-18 PROCEDURE — OTHER PHOTO-DOCUMENTATION: OTHER

## 2020-03-18 PROCEDURE — 99213 OFFICE O/P EST LOW 20 MIN: CPT | Mod: 25

## 2020-03-18 PROCEDURE — 17000 DESTRUCT PREMALG LESION: CPT | Mod: 59

## 2020-03-18 PROCEDURE — OTHER COUNSELING: OTHER

## 2020-03-18 PROCEDURE — OTHER LIQUID NITROGEN: OTHER

## 2020-03-18 ASSESSMENT — LOCATION SIMPLE DESCRIPTION DERM
LOCATION SIMPLE: CHEST
LOCATION SIMPLE: LEFT FOREHEAD
LOCATION SIMPLE: LEFT CHEEK
LOCATION SIMPLE: RIGHT ZYGOMA
LOCATION SIMPLE: LEFT ANTERIOR NECK

## 2020-03-18 ASSESSMENT — LOCATION DETAILED DESCRIPTION DERM
LOCATION DETAILED: LEFT MEDIAL SUPERIOR CHEST
LOCATION DETAILED: LEFT INFERIOR MEDIAL MALAR CHEEK
LOCATION DETAILED: LEFT INFERIOR ANTERIOR NECK
LOCATION DETAILED: RIGHT MEDIAL ZYGOMA
LOCATION DETAILED: LEFT SUPERIOR MEDIAL FOREHEAD

## 2020-03-18 ASSESSMENT — LOCATION ZONE DERM
LOCATION ZONE: FACE
LOCATION ZONE: TRUNK
LOCATION ZONE: NECK

## 2020-03-18 NOTE — PROCEDURE: BIOPSY BY SHAVE METHOD
Was A Bandage Applied: Yes
Bill 50842 For Specimen Handling/Conveyance To Laboratory?: no
X Size Of Lesion In Cm: 0
Hemostasis: Drysol
Biopsy Type: H and E
Silver Nitrate Text: The wound bed was treated with silver nitrate after the biopsy was performed.
Electrodesiccation And Curettage Text: The wound bed was treated with electrodesiccation and curettage after the biopsy was performed.
Type Of Destruction Used: Curettage
Biopsy Method: sterile single edge surgical blade
Wound Care: Aquaphor
Billing Type: Third-Party Bill
Electrodesiccation Text: The wound bed was treated with electrodesiccation after the biopsy was performed.
Detail Level: Detailed
Cryotherapy Text: The wound bed was treated with cryotherapy after the biopsy was performed.
Depth Of Biopsy: dermis
Consent: Written consent was obtained and risks were reviewed including but not limited to scarring, infection, bleeding, scabbing, incomplete removal, nerve damage and allergy to anesthesia.
Curettage Text: The wound bed was treated with curettage after the biopsy was performed.
Dressing: bandage
Notification Instructions: Patient will be notified of biopsy results. However, patient instructed to call the office if not contacted within 2 weeks.
Information: Selecting Yes will display possible errors in your note based on the variables you have selected. This validation is only offered as a suggestion for you. PLEASE NOTE THAT THE VALIDATION TEXT WILL BE REMOVED WHEN YOU FINALIZE YOUR NOTE. IF YOU WANT TO FAX A PRELIMINARY NOTE YOU WILL NEED TO TOGGLE THIS TO 'NO' IF YOU DO NOT WANT IT IN YOUR FAXED NOTE.
Anesthesia Volume In Cc (Will Not Render If 0): 0.5
Post-Care Instructions: I reviewed with the patient in detail post-care instructions. Patient is to keep the biopsy site dry overnight, and then apply bacitracin twice daily until healed. Patient may apply hydrogen peroxide soaks to remove any crusting.

## 2020-03-18 NOTE — PROCEDURE: LIQUID NITROGEN
Number Of Freeze-Thaw Cycles: 1 freeze-thaw cycle
Duration Of Freeze Thaw-Cycle (Seconds): 2
Render Post-Care Instructions In Note?: no
Post-Care Instructions: I reviewed with the patient in detail post-care instructions. Patient is to wear sunprotection, and avoid picking at any of the treated lesions. Pt may apply Vaseline to crusted or scabbing areas.
Detail Level: Detailed
Consent: The patient's consent was obtained including but not limited to risks of crusting, scabbing, blistering, scarring, darker or lighter pigmentary change, recurrence, incomplete removal and infection.

## 2020-08-17 ENCOUNTER — APPOINTMENT (OUTPATIENT)
Dept: URBAN - METROPOLITAN AREA CLINIC 200 | Age: 70
Setting detail: DERMATOLOGY
End: 2020-08-30

## 2020-08-17 DIAGNOSIS — L57.0 ACTINIC KERATOSIS: ICD-10-CM

## 2020-08-17 DIAGNOSIS — D485 NEOPLASM OF UNCERTAIN BEHAVIOR OF SKIN: ICD-10-CM

## 2020-08-17 PROBLEM — D48.5 NEOPLASM OF UNCERTAIN BEHAVIOR OF SKIN: Status: ACTIVE | Noted: 2020-08-17

## 2020-08-17 PROCEDURE — 17000 DESTRUCT PREMALG LESION: CPT | Mod: 59

## 2020-08-17 PROCEDURE — 17003 DESTRUCT PREMALG LES 2-14: CPT

## 2020-08-17 PROCEDURE — OTHER LIQUID NITROGEN: OTHER

## 2020-08-17 PROCEDURE — 11103 TANGNTL BX SKIN EA SEP/ADDL: CPT

## 2020-08-17 PROCEDURE — 11102 TANGNTL BX SKIN SINGLE LES: CPT

## 2020-08-17 PROCEDURE — OTHER BIOPSY BY SHAVE METHOD: OTHER

## 2020-08-17 ASSESSMENT — LOCATION DETAILED DESCRIPTION DERM
LOCATION DETAILED: LEFT INFERIOR CENTRAL MALAR CHEEK
LOCATION DETAILED: NASAL TIP
LOCATION DETAILED: LEFT CENTRAL BUCCAL CHEEK
LOCATION DETAILED: RIGHT NASAL SIDEWALL

## 2020-08-17 ASSESSMENT — LOCATION ZONE DERM
LOCATION ZONE: FACE
LOCATION ZONE: NOSE

## 2020-08-17 ASSESSMENT — LOCATION SIMPLE DESCRIPTION DERM
LOCATION SIMPLE: RIGHT NOSE
LOCATION SIMPLE: NOSE
LOCATION SIMPLE: LEFT CHEEK

## 2020-08-17 NOTE — PROCEDURE: LIQUID NITROGEN
Consent: The patient's consent was obtained including but not limited to risks of crusting, scabbing, blistering, scarring, darker or lighter pigmentary change, recurrence, incomplete removal and infection.
Render Post-Care Instructions In Note?: no
Post-Care Instructions: I reviewed with the patient in detail post-care instructions. Patient is to wear sunprotection, and avoid picking at any of the treated lesions. Pt may apply Vaseline to crusted or scabbing areas.
Duration Of Freeze Thaw-Cycle (Seconds): 2
Detail Level: Detailed
Number Of Freeze-Thaw Cycles: 1 freeze-thaw cycle

## 2020-08-17 NOTE — PROCEDURE: BIOPSY BY SHAVE METHOD
Render In Bullet Format When Appropriate: No
Was A Bandage Applied: Yes
Additional Anesthesia Volume In Cc (Will Not Render If 0): 0
Electrodesiccation Text: The wound bed was treated with electrodesiccation after the biopsy was performed.
Wound Care: Aquaphor
Post-Care Instructions: I reviewed with the patient in detail post-care instructions. Patient is to keep the biopsy site dry overnight, and then apply bacitracin twice daily until healed. Patient may apply hydrogen peroxide soaks to remove any crusting.
Biopsy Type: H and E
Cryotherapy Text: The wound bed was treated with cryotherapy after the biopsy was performed.
Dressing: bandage
Notification Instructions: Patient will be notified of biopsy results. However, patient instructed to call the office if not contacted within 2 weeks.
Information: Selecting Yes will display possible errors in your note based on the variables you have selected. This validation is only offered as a suggestion for you. PLEASE NOTE THAT THE VALIDATION TEXT WILL BE REMOVED WHEN YOU FINALIZE YOUR NOTE. IF YOU WANT TO FAX A PRELIMINARY NOTE YOU WILL NEED TO TOGGLE THIS TO 'NO' IF YOU DO NOT WANT IT IN YOUR FAXED NOTE.
Curettage Text: The wound bed was treated with curettage after the biopsy was performed.
Silver Nitrate Text: The wound bed was treated with silver nitrate after the biopsy was performed.
Detail Level: Detailed
Hemostasis: Drysol
Anesthesia Volume In Cc (Will Not Render If 0): 0.5
Consent: Written consent was obtained and risks were reviewed including but not limited to scarring, infection, bleeding, scabbing, incomplete removal, nerve damage and allergy to anesthesia.
Billing Type: Third-Party Bill
Type Of Destruction Used: Curettage
Depth Of Biopsy: dermis
Biopsy Method: sterile single edge surgical blade
Electrodesiccation And Curettage Text: The wound bed was treated with electrodesiccation and curettage after the biopsy was performed.

## 2020-10-09 ENCOUNTER — RX ONLY (RX ONLY)
Age: 70
End: 2020-10-09

## 2020-10-09 ENCOUNTER — APPOINTMENT (OUTPATIENT)
Dept: URBAN - METROPOLITAN AREA CLINIC 200 | Age: 70
Setting detail: DERMATOLOGY
End: 2020-10-28

## 2020-10-09 DIAGNOSIS — Z85.828 PERSONAL HISTORY OF OTHER MALIGNANT NEOPLASM OF SKIN: ICD-10-CM

## 2020-10-09 DIAGNOSIS — L57.8 OTHER SKIN CHANGES DUE TO CHRONIC EXPOSURE TO NONIONIZING RADIATION: ICD-10-CM

## 2020-10-09 DIAGNOSIS — L57.0 ACTINIC KERATOSIS: ICD-10-CM

## 2020-10-09 PROCEDURE — OTHER PRESCRIPTION: OTHER

## 2020-10-09 PROCEDURE — 17000 DESTRUCT PREMALG LESION: CPT

## 2020-10-09 PROCEDURE — 99213 OFFICE O/P EST LOW 20 MIN: CPT | Mod: 25

## 2020-10-09 PROCEDURE — OTHER MIPS QUALITY: OTHER

## 2020-10-09 PROCEDURE — 17003 DESTRUCT PREMALG LES 2-14: CPT

## 2020-10-09 PROCEDURE — OTHER COUNSELING: OTHER

## 2020-10-09 PROCEDURE — OTHER LIQUID NITROGEN: OTHER

## 2020-10-09 RX ORDER — FLUOROURACIL 5 MG/G
CREAM TOPICAL
Qty: 1 | Refills: 3 | Status: ERX | COMMUNITY
Start: 2020-10-09

## 2020-10-09 RX ORDER — BIMATOPROST 0.3 MG/ML
SOLUTION/ DROPS OPHTHALMIC
Qty: 1 | Refills: 5 | Status: CANCELLED
Stop reason: ENTERED-IN-ERROR

## 2020-10-09 RX ORDER — BIMATOPROST 0.3 MG/ML
SOLUTION/ DROPS OPHTHALMIC
Qty: 1 | Refills: 4 | Status: ERX | COMMUNITY
Start: 2020-10-09

## 2020-10-09 ASSESSMENT — LOCATION SIMPLE DESCRIPTION DERM
LOCATION SIMPLE: RIGHT SCALP
LOCATION SIMPLE: RIGHT NOSE
LOCATION SIMPLE: CHEST
LOCATION SIMPLE: LEFT ANTERIOR NECK
LOCATION SIMPLE: RIGHT CHEEK
LOCATION SIMPLE: RIGHT ZYGOMA
LOCATION SIMPLE: LEFT CHEEK
LOCATION SIMPLE: ABDOMEN
LOCATION SIMPLE: INFERIOR FOREHEAD

## 2020-10-09 ASSESSMENT — LOCATION ZONE DERM
LOCATION ZONE: TRUNK
LOCATION ZONE: NECK
LOCATION ZONE: FACE
LOCATION ZONE: SCALP
LOCATION ZONE: NOSE

## 2020-10-09 ASSESSMENT — LOCATION DETAILED DESCRIPTION DERM
LOCATION DETAILED: RIGHT MEDIAL MALAR CHEEK
LOCATION DETAILED: RIGHT CENTRAL ZYGOMA
LOCATION DETAILED: RIGHT NASAL SIDEWALL
LOCATION DETAILED: LEFT MEDIAL SUPERIOR CHEST
LOCATION DETAILED: RIGHT CENTRAL FRONTAL SCALP
LOCATION DETAILED: INFERIOR MID FOREHEAD
LOCATION DETAILED: LEFT CENTRAL BUCCAL CHEEK
LOCATION DETAILED: LEFT INFERIOR ANTERIOR NECK
LOCATION DETAILED: PERIUMBILICAL SKIN

## 2020-10-14 ENCOUNTER — RX ONLY (RX ONLY)
Age: 70
End: 2020-10-14

## 2020-10-14 RX ORDER — BIMATOPROST 0.3 MG/ML
SOLUTION/ DROPS OPHTHALMIC
Qty: 1 | Refills: 6 | Status: ERX | COMMUNITY
Start: 2020-10-14

## 2021-04-09 ENCOUNTER — APPOINTMENT (OUTPATIENT)
Dept: URBAN - METROPOLITAN AREA CLINIC 200 | Age: 71
Setting detail: DERMATOLOGY
End: 2021-04-13

## 2021-04-09 DIAGNOSIS — Z85.828 PERSONAL HISTORY OF OTHER MALIGNANT NEOPLASM OF SKIN: ICD-10-CM

## 2021-04-09 DIAGNOSIS — L57.0 ACTINIC KERATOSIS: ICD-10-CM

## 2021-04-09 DIAGNOSIS — L82.1 OTHER SEBORRHEIC KERATOSIS: ICD-10-CM

## 2021-04-09 DIAGNOSIS — L57.8 OTHER SKIN CHANGES DUE TO CHRONIC EXPOSURE TO NONIONIZING RADIATION: ICD-10-CM

## 2021-04-09 PROCEDURE — 99212 OFFICE O/P EST SF 10 MIN: CPT | Mod: 25

## 2021-04-09 PROCEDURE — OTHER COUNSELING: OTHER

## 2021-04-09 PROCEDURE — OTHER REASSURANCE: OTHER

## 2021-04-09 PROCEDURE — 17000 DESTRUCT PREMALG LESION: CPT

## 2021-04-09 PROCEDURE — 17003 DESTRUCT PREMALG LES 2-14: CPT

## 2021-04-09 PROCEDURE — OTHER LIQUID NITROGEN: OTHER

## 2021-04-09 ASSESSMENT — LOCATION ZONE DERM
LOCATION ZONE: TRUNK
LOCATION ZONE: SCALP
LOCATION ZONE: NECK
LOCATION ZONE: NOSE

## 2021-04-09 ASSESSMENT — PAIN INTENSITY VAS: HOW INTENSE IS YOUR PAIN 0 BEING NO PAIN, 10 BEING THE MOST SEVERE PAIN POSSIBLE?: NO PAIN

## 2021-04-09 ASSESSMENT — LOCATION DETAILED DESCRIPTION DERM
LOCATION DETAILED: LEFT MEDIAL SUPERIOR CHEST
LOCATION DETAILED: RIGHT INFERIOR MEDIAL UPPER BACK
LOCATION DETAILED: RIGHT SUPERIOR PARIETAL SCALP
LOCATION DETAILED: LEFT INFERIOR ANTERIOR NECK
LOCATION DETAILED: LEFT SUPERIOR PARIETAL SCALP
LOCATION DETAILED: NASAL TIP
LOCATION DETAILED: LEFT CENTRAL FRONTAL SCALP

## 2021-04-09 ASSESSMENT — LOCATION SIMPLE DESCRIPTION DERM
LOCATION SIMPLE: CHEST
LOCATION SIMPLE: SCALP
LOCATION SIMPLE: RIGHT UPPER BACK
LOCATION SIMPLE: NOSE
LOCATION SIMPLE: LEFT ANTERIOR NECK
LOCATION SIMPLE: LEFT SCALP

## 2021-09-03 ENCOUNTER — TELEPHONE (OUTPATIENT)
Dept: SURGERY | Facility: CLINIC | Age: 71
End: 2021-09-03

## 2021-09-03 NOTE — TELEPHONE ENCOUNTER
"   Who referred you to our office? Dr. Santino Traylor     1.  Have you had spine surgery, including a spinal cord stimulator?No  No- Go to question 3.    Yes-  Did you have the surgery with Dr. Abad?  Yes-   Go to question 3.    No- “Because you've had prior surgery, the practice requires you to drop off, or mail, a recent (post-surgery) MRI or CT scan. As a courtesy, Dr. Abad will review the imaging study to determine if he is able to offer surgical options. If Dr. Abad does not feel there is a surgical solution he can offer, his nurse will call you with that information, and you won't need to come into the office. However, I am going to ask you a few more questions to get a better idea of how we can help you.”  Go to question 3.    2. Is your pain related to a worker's compensation injury or a recent (within 2 years) auto accident?No  No- Go to question 4.    Yes- “Unfortunately, Dr. Abad does not accept these insurances”  (No additional questions, please forward the encounter to provider pool-[email or web address])     3. Where is your pain located (neck (cervical), mid (thoracic), or low back (lumbar))? Lumbar   If the patient has multiple areas of pain, explain” I will make a note of this in your chart. However, Dr. Abad will be able to focus on only ONE area at your appointment.”   Go to question 5.    4. Have you completed an MRI or CT scan of your spine within the last 18 months?MRI Lumbar Jan 2021 - [facility] - Pt will bring disc     Yes- \"please bring the discs and reports of these images to your appointment.\"      • Schedule with Dr. Abad  • Go to question 6.     No- \"I can schedule you an appointment with Faviola, Dr. Abad's physician assistant.  She evaluates patients who do not have MRIs or CTs to help start a treatment plan. If after your initial visit with Faviola, it is appropriate to see Dr. Abad, we will schedule accordingly.\"     • Schedule with Faviola (make " appointment length 60 minutes)  • Go to question 6.     5. Have you completed x-rays of your spine within the last year? No    Yes- please bring the discs and reports of these images to your appointment.  •  Go to question 7.    No- “We will order an x-ray for you, to complete prior to your visit. Are you able to complete the x-rays at a [facility] facility?”    • If patient cannot go to [facility], find out where they will be going so order can be faxed.   • Remind patient to bring the disc of their images, and written report, to their appointment.  • Schedule appointment 7-10 days out to give patient time to complete x-rays.  • Go to question 7.    6.  Does your pain radiate? Right leg   • Go to question 8.       7. Do you have numbness or tingling? Yes  •  Go to question 9.    8. Do you have weakness? Yes  • Go to question 10.     9. Have you completed physical therapy? [facility] 2018   • Go to question 11.    Have Dr. Hodgson you completed spinal injections? Yes, April 2021 -- Epidral L5 -- Dr. Zuñiga     Please forward Telephone Encounter to [facility] Provider Pool.    Before disconnecting with the patient, inform them …” Dr. Abad cannot prescribe narcotic pain medication or perform epidural injections. But he can refer you to an injection specialist if that is determined to be the best treatment for you.”    Instruct all patients to arrive 20 minutes prior to appointment for Check In.    If you do not schedule a patient for an appointment,   please give a reason for not scheduling!!

## 2021-09-09 ENCOUNTER — OFFICE VISIT (OUTPATIENT)
Dept: SURGERY | Facility: CLINIC | Age: 71
End: 2021-09-09
Payer: MEDICARE

## 2021-09-09 VITALS
HEART RATE: 88 BPM | DIASTOLIC BLOOD PRESSURE: 80 MMHG | RESPIRATION RATE: 18 BRPM | WEIGHT: 146 LBS | BODY MASS INDEX: 21.62 KG/M2 | OXYGEN SATURATION: 99 % | SYSTOLIC BLOOD PRESSURE: 138 MMHG | HEIGHT: 69 IN

## 2021-09-09 DIAGNOSIS — M48.062 SPINAL STENOSIS OF LUMBAR REGION WITH NEUROGENIC CLAUDICATION: Primary | ICD-10-CM

## 2021-09-09 PROCEDURE — 99204 OFFICE O/P NEW MOD 45 MIN: CPT | Performed by: ORTHOPAEDIC SURGERY

## 2021-09-09 NOTE — LETTER
2021     Francine Flores MD  [address]  [address]  [facility] [address]    Patient: Windy Kitchen  YOB: 1950  Date of Visit: 2021      Dear Dr. Flores:    Thank you for referring Windy Kitchen to me for evaluation. Below are my notes for this consultation.    If you have questions, please do not hesitate to call me. I look forward to following your patient along with you.         Sincerely,        Emilio Abad MD        CC: No Recipients  Emilio Abad MD  2021  2:14 PM  Sign when Signing Visit  NAME: Windy Kitchen  : 1950  PCP: Francine Flores MD      Chief Complaint: back and leg pain    HPI:  71 y.o. female presenting for initial visit with chief complaint of back and leg pain.  Pain began multiple years ago. Describes pain as aching and throbbing in nature.  Located in the bilateral buttocks and thighs.  There is associated numbness .  Back pain 20%, Leg pain 80%.   Pain is worse with standing and walking and improves with sitting.  Has had multiple spinal injections.  She has done significant physical therapy  Denies any papo trauma. Denies fever or chills. Denies any bladder or bowel changes.      PAST MEDICAL HISTORY:   Past Medical History:   Diagnosis Date   • Basal cell carcinoma of skin    • Chronic kidney disease, stage II (mild)    • Greater trochanteric bursitis of left hip    • Low back pain    • Menopause    • Osteoarthritis    • Spondylolisthesis, lumbosacral region    • Urinary hesitancy        MEDICATIONS:  Current Outpatient Medications   Medication Sig Dispense Refill   • naproxen (NAPROSYN) 500 mg tablet Take 500 mg by mouth 2 (two) times a day with meals.     • omeprazole (PriLOSEC) 20 mg capsule Take by mouth once daily.  12   • valACYclovir (VALTREX) 500 mg tablet        No current facility-administered medications for this visit.       PAST SURGICAL HISTORY:  History reviewed. No pertinent surgical  history.    SOCIAL HISTORY:  Social History     Socioeconomic History   • Marital status:      Spouse name: Not on file   • Number of children: Not on file   • Years of education: Not on file   • Highest education level: Not on file   Occupational History   • Not on file   Tobacco Use   • Smoking status: Never Smoker   • Smokeless tobacco: Never Used   Vaping Use   • Vaping Use: Never used   Substance and Sexual Activity   • Alcohol use: Not on file   • Drug use: Not on file   • Sexual activity: Not on file   Other Topics Concern   • Not on file   Social History Narrative   • Not on file     Social Determinants of Health     Financial Resource Strain:    • Difficulty of Paying Living Expenses:    Food Insecurity:    • Worried About Running Out of Food in the Last Year:    • Ran Out of Food in the Last Year:    Transportation Needs:    • Lack of Transportation (Medical):    • Lack of Transportation (Non-Medical):    Physical Activity:    • Days of Exercise per Week:    • Minutes of Exercise per Session:    Stress:    • Feeling of Stress :    Social Connections:    • Frequency of Communication with Friends and Family:    • Frequency of Social Gatherings with Friends and Family:    • Attends Mosque Services:    • Active Member of Clubs or Organizations:    • Attends Club or Organization Meetings:    • Marital Status:    Intimate Partner Violence:    • Fear of Current or Ex-Partner:    • Emotionally Abused:    • Physically Abused:    • Sexually Abused:        ALLERGIES:  Allergies   Allergen Reactions   • Penicillins Hives       ROS:   Constitutional:  No fever, chills, night sweats, decreased appetite   HEENT No change in vision, no difficulty hearing, no sore throat, no difficulty swallowing   Cardiovascular:  No chest pain, palpitations, heart murmur   Respiratory:  No SOB, coughing, wheezes/rales/rhonchi, chest discomfort or tightness (angina)   Gastrointestinal:  No nausea, vomiting, abdominal pain, or  "liver problems    Genitourinary:  No dysuria or incontinence    Musculoskeletal:  See HPI   Skin:  No rash or erythema   Neurologic:  See HPI   Psychiatric Illness:  No confusion   Hematological/   Lymphatic:  No abnormal bleeding or swollen lymph nodes.    Allergic/Immunologic:  No hay fever and Lupus.      PHYSICAL EXAM:  Visit Vitals  /80 (BP Location: Left upper arm, Patient Position: Sitting)   Pulse 88   Resp 18   Ht 1.753 m (5' 9\")   Wt 66.2 kg (146 lb)   SpO2 99%   BMI 21.56 kg/m²         General:  Well-developed,appears well, no acute distress   HEENT Normocephalic. Sclera nonicteric. Negative for masses, asymmetry and tracheal deviation.    Respiratory:  No SOB, no abnormal effort (use of accessory muscles).    CV:  Pulses regular rate.  All extremities warm with brisk capillary refill.   Neurologic:  Alert and oriented to person, place and time.    GI / Abdominal:  Soft. No abdominal masses or tenderness. Nondistended.    Gait & balance No evidence of myelopathic gait.      Neurologic:    Lower Extremity Motor Function    Right  Left    Iliopsoas  5/5  5/5    Quadriceps 5/5 5/5   Tibialis anterior  4/5  5/5    EHL  4/5  5/5    Gastroc. muscle  5/5  5/5                Sensory: light touch is intact to bilateral upper and lower extremities       IMAGING: I have personally reviewed the images and these are my findings:  MR Pelvis: MRI of the pelvis demonstrates severe spinal stenosis at L4-L5 and severe neuroforaminal stenosis at L5-S1.  There is also spondylolisthesis at L4-L5 and L5-S1.    ASSESSMENT/PLAN:  Ms. Kitchen is a very pleasant 71-year-old female with longstanding back and lower extremity pain.  Her MRI of the pelvis demonstrates severe spinal stenosis at L4-L5 and foraminal stenosis at L5-S1.  It is unclear whether she has significant compression of the neurologic elements cranial to this level.  I would like to obtain additional imaging to better view the spine.  This would require standing " bending films as well as scoliosis x-rays.  I would also like to obtain an updated MRI of the lumbar spine given that the prior one is approximately 20 months old.  Once I have this information I will be able to determine the best options for her moving forward.  These may range from continued observation, additional injections or surgical intervention.  I believe that a majority of her pain is neurogenic in nature and therefore she may be a good candidate for surgical intervention.  However, I will review the images and will provide multiple options for her upon her return visit.        The above dictation was performed using Dragon dictation software.  Please excuse any typos or grammatical errors. If there are any portions of this note that are unclear, please feel free to reach out to me directly.  My office number is [phone].

## 2021-09-09 NOTE — PROGRESS NOTES
NAME: Windy Kitchen  : 1950  PCP: Francine Flores MD      Chief Complaint: back and leg pain    HPI:  71 y.o. female presenting for initial visit with chief complaint of back and leg pain.  Pain began multiple years ago. Describes pain as aching and throbbing in nature.  Located in the bilateral buttocks and thighs.  There is associated numbness .  Back pain 20%, Leg pain 80%.   Pain is worse with standing and walking and improves with sitting.  Has had multiple spinal injections.  She has done significant physical therapy  Denies any papo trauma. Denies fever or chills. Denies any bladder or bowel changes.      PAST MEDICAL HISTORY:   Past Medical History:   Diagnosis Date   • Basal cell carcinoma of skin    • Chronic kidney disease, stage II (mild)    • Greater trochanteric bursitis of left hip    • Low back pain    • Menopause    • Osteoarthritis    • Spondylolisthesis, lumbosacral region    • Urinary hesitancy        MEDICATIONS:  Current Outpatient Medications   Medication Sig Dispense Refill   • naproxen (NAPROSYN) 500 mg tablet Take 500 mg by mouth 2 (two) times a day with meals.     • omeprazole (PriLOSEC) 20 mg capsule Take by mouth once daily.  12   • valACYclovir (VALTREX) 500 mg tablet        No current facility-administered medications for this visit.       PAST SURGICAL HISTORY:  History reviewed. No pertinent surgical history.    SOCIAL HISTORY:  Social History     Socioeconomic History   • Marital status:      Spouse name: Not on file   • Number of children: Not on file   • Years of education: Not on file   • Highest education level: Not on file   Occupational History   • Not on file   Tobacco Use   • Smoking status: Never Smoker   • Smokeless tobacco: Never Used   Vaping Use   • Vaping Use: Never used   Substance and Sexual Activity   • Alcohol use: Not on file   • Drug use: Not on file   • Sexual activity: Not on file   Other Topics Concern   • Not on file   Social History  "Narrative   • Not on file     Social Determinants of Health     Financial Resource Strain:    • Difficulty of Paying Living Expenses:    Food Insecurity:    • Worried About Running Out of Food in the Last Year:    • Ran Out of Food in the Last Year:    Transportation Needs:    • Lack of Transportation (Medical):    • Lack of Transportation (Non-Medical):    Physical Activity:    • Days of Exercise per Week:    • Minutes of Exercise per Session:    Stress:    • Feeling of Stress :    Social Connections:    • Frequency of Communication with Friends and Family:    • Frequency of Social Gatherings with Friends and Family:    • Attends Anabaptism Services:    • Active Member of Clubs or Organizations:    • Attends Club or Organization Meetings:    • Marital Status:    Intimate Partner Violence:    • Fear of Current or Ex-Partner:    • Emotionally Abused:    • Physically Abused:    • Sexually Abused:        ALLERGIES:  Allergies   Allergen Reactions   • Penicillins Hives       ROS:   Constitutional:  No fever, chills, night sweats, decreased appetite   HEENT No change in vision, no difficulty hearing, no sore throat, no difficulty swallowing   Cardiovascular:  No chest pain, palpitations, heart murmur   Respiratory:  No SOB, coughing, wheezes/rales/rhonchi, chest discomfort or tightness (angina)   Gastrointestinal:  No nausea, vomiting, abdominal pain, or liver problems    Genitourinary:  No dysuria or incontinence    Musculoskeletal:  See HPI   Skin:  No rash or erythema   Neurologic:  See HPI   Psychiatric Illness:  No confusion   Hematological/   Lymphatic:  No abnormal bleeding or swollen lymph nodes.    Allergic/Immunologic:  No hay fever and Lupus.      PHYSICAL EXAM:  Visit Vitals  /80 (BP Location: Left upper arm, Patient Position: Sitting)   Pulse 88   Resp 18   Ht 1.753 m (5' 9\")   Wt 66.2 kg (146 lb)   SpO2 99%   BMI 21.56 kg/m²         General:  Well-developed,appears well, no acute distress   HEENT " Normocephalic. Sclera nonicteric. Negative for masses, asymmetry and tracheal deviation.    Respiratory:  No SOB, no abnormal effort (use of accessory muscles).    CV:  Pulses regular rate.  All extremities warm with brisk capillary refill.   Neurologic:  Alert and oriented to person, place and time.    GI / Abdominal:  Soft. No abdominal masses or tenderness. Nondistended.    Gait & balance No evidence of myelopathic gait.      Neurologic:    Lower Extremity Motor Function    Right  Left    Iliopsoas  5/5  5/5    Quadriceps 5/5 5/5   Tibialis anterior  4/5  5/5    EHL  4/5  5/5    Gastroc. muscle  5/5  5/5                Sensory: light touch is intact to bilateral upper and lower extremities       IMAGING: I have personally reviewed the images and these are my findings:  MR Pelvis: MRI of the pelvis demonstrates severe spinal stenosis at L4-L5 and severe neuroforaminal stenosis at L5-S1.  There is also spondylolisthesis at L4-L5 and L5-S1.    ASSESSMENT/PLAN:  Ms. Kitchen is a very pleasant 71-year-old female with longstanding back and lower extremity pain.  Her MRI of the pelvis demonstrates severe spinal stenosis at L4-L5 and foraminal stenosis at L5-S1.  It is unclear whether she has significant compression of the neurologic elements cranial to this level.  I would like to obtain additional imaging to better view the spine.  This would require standing bending films as well as scoliosis x-rays.  I would also like to obtain an updated MRI of the lumbar spine given that the prior one is approximately 20 months old.  Once I have this information I will be able to determine the best options for her moving forward.  These may range from continued observation, additional injections or surgical intervention.  I believe that a majority of her pain is neurogenic in nature and therefore she may be a good candidate for surgical intervention.  However, I will review the images and will provide multiple options for her upon  her return visit.        The above dictation was performed using Dragon dictation software.  Please excuse any typos or grammatical errors. If there are any portions of this note that are unclear, please feel free to reach out to me directly.  My office number is [phone].

## 2021-09-13 ENCOUNTER — HOSPITAL ENCOUNTER (OUTPATIENT)
Dept: RADIOLOGY | Age: 71
Discharge: HOME | End: 2021-09-13
Attending: ORTHOPAEDIC SURGERY
Payer: MEDICARE

## 2021-09-13 ENCOUNTER — TELEPHONE (OUTPATIENT)
Dept: SURGERY | Facility: CLINIC | Age: 71
End: 2021-09-13

## 2021-09-13 DIAGNOSIS — M48.062 SPINAL STENOSIS OF LUMBAR REGION WITH NEUROGENIC CLAUDICATION: ICD-10-CM

## 2021-09-13 PROCEDURE — 72082 X-RAY EXAM ENTIRE SPI 2/3 VW: CPT

## 2021-09-13 PROCEDURE — 72114 X-RAY EXAM L-S SPINE BENDING: CPT

## 2021-09-22 ENCOUNTER — OFFICE VISIT (OUTPATIENT)
Dept: SURGERY | Facility: CLINIC | Age: 71
End: 2021-09-22
Payer: MEDICARE

## 2021-09-22 VITALS
BODY MASS INDEX: 21.48 KG/M2 | SYSTOLIC BLOOD PRESSURE: 142 MMHG | HEART RATE: 71 BPM | DIASTOLIC BLOOD PRESSURE: 78 MMHG | OXYGEN SATURATION: 97 % | RESPIRATION RATE: 18 BRPM | WEIGHT: 145 LBS | HEIGHT: 69 IN

## 2021-09-22 DIAGNOSIS — M41.50 DEGENERATIVE SCOLIOSIS IN ADULT PATIENT: Primary | ICD-10-CM

## 2021-09-22 DIAGNOSIS — M48.062 SPINAL STENOSIS OF LUMBAR REGION WITH NEUROGENIC CLAUDICATION: ICD-10-CM

## 2021-09-22 PROCEDURE — 99214 OFFICE O/P EST MOD 30 MIN: CPT | Performed by: ORTHOPAEDIC SURGERY

## 2021-09-22 RX ORDER — CALCIUM CARBONATE/VITAMIN D3 500 MG-10
1 TABLET,CHEWABLE ORAL DAILY
COMMUNITY

## 2021-09-22 RX ORDER — BIMATOPROST 3 UG/ML
1 SOLUTION TOPICAL DAILY
COMMUNITY

## 2021-09-22 NOTE — PROGRESS NOTES
Windy Kitchen presents today for follow up.  She continues to have rather debilitating back and bilateral lower extremity pain.  She underwent recent xrays and an MRI scan.  She is here to review the surgical options.      Neurologic Exam  Lower Extremity Motor Function    Right  Left    Iliopsoas  5/5  5/5    Quadriceps 5/5 5/5   Tibialis anterior  5/5  5/5    EHL  3/5  5/5    Gastroc. muscle  5/5  5/5                Decreased right L5 sensation      Assessment/Plan:  Ms. Kitchen is a very pleasant 71-year-old female with back and bilateral lower extremity pain.  Her predominant symptom is neurogenic pain.  She had updated images that demonstrate a degenerative scoliosis as well as severe foraminal and central canal stenosis at L4-L5 combined with a spondylolisthesis.  There is also instability and severe neuroforaminal stenosis at the L5-S1 level.  We discussed various treatment options for this including continued observation, additional injections or surgical intervention.  Surgery be most appropriately performed with an L4-S1 fusion.  In my opinion, the least risky and most reliable way to perform this would be an L4-S1 anterior posterior fusion.  We discussed risk and benefits of the surgery.  She stated that she would like to move forward with surgery.  Of note she has a vacation planned for early January.  She would like to undergo surgery sometime in early November in order to accommodate this plan.  We will determine a surgical date and will inform her of the state.  She will require medical clearance.      The above dictation was performed using Dragon dictation software.  Please excuse any typos or grammatical errors. If there are any portions of this note that are unclear, please feel free to reach out to me directly.  My office number is [phone].

## 2021-09-23 ENCOUNTER — PREP FOR CASE (OUTPATIENT)
Dept: SURGERY | Facility: CLINIC | Age: 71
End: 2021-09-23

## 2021-09-23 ENCOUNTER — TELEPHONE (OUTPATIENT)
Dept: SURGERY | Facility: CLINIC | Age: 71
End: 2021-09-23

## 2021-09-23 DIAGNOSIS — R79.1 ABNORMAL COAGULATION PROFILE: ICD-10-CM

## 2021-09-23 DIAGNOSIS — M48.062 SPINAL STENOSIS, LUMBAR REGION, WITH NEUROGENIC CLAUDICATION: ICD-10-CM

## 2021-09-23 DIAGNOSIS — M43.16 SPONDYLOLISTHESIS OF LUMBAR REGION: Primary | ICD-10-CM

## 2021-09-23 RX ORDER — SODIUM CHLORIDE, SODIUM LACTATE, POTASSIUM CHLORIDE, CALCIUM CHLORIDE 600; 310; 30; 20 MG/100ML; MG/100ML; MG/100ML; MG/100ML
INJECTION, SOLUTION INTRAVENOUS
Status: CANCELLED | OUTPATIENT
Start: 2021-09-23

## 2021-09-23 RX ORDER — ACETAMINOPHEN 325 MG/1
1000 TABLET ORAL ONCE
Status: CANCELLED | OUTPATIENT
Start: 2021-09-23 | End: 2021-09-23

## 2021-09-24 PROBLEM — M48.062 SPINAL STENOSIS, LUMBAR REGION, WITH NEUROGENIC CLAUDICATION: Status: ACTIVE | Noted: 2021-09-24

## 2021-10-04 ENCOUNTER — CONSULT (OUTPATIENT)
Dept: SURGERY | Facility: CLINIC | Age: 71
End: 2021-10-04
Payer: MEDICARE

## 2021-10-04 VITALS
SYSTOLIC BLOOD PRESSURE: 158 MMHG | DIASTOLIC BLOOD PRESSURE: 80 MMHG | HEIGHT: 69 IN | OXYGEN SATURATION: 97 % | HEART RATE: 78 BPM | WEIGHT: 146 LBS | BODY MASS INDEX: 21.62 KG/M2 | RESPIRATION RATE: 18 BRPM

## 2021-10-04 DIAGNOSIS — M43.17 SPONDYLOLISTHESIS, LUMBOSACRAL REGION: Primary | ICD-10-CM

## 2021-10-04 PROCEDURE — 99213 OFFICE O/P EST LOW 20 MIN: CPT | Performed by: PHYSICIAN ASSISTANT

## 2021-10-04 NOTE — LETTER
October 4, 2021     Jose Turner MD  [address]   [address] [place] [address]    Patient: Windy Kitchen  YOB: 1950  Date of Visit: 10/4/2021      Dear Dr. Turner:    Thank you for referring Windy Kitchen to me for evaluation. Below are my notes for this consultation.    If you have questions, please do not hesitate to call me. I look forward to following your patient along with you.         Sincerely,        MELVA De Los Santos        CC: MD Alberto Canela Theresa Ann, PA C  10/4/2021 12:25 PM  Signed  Windy Kitchen presents today for follow up. She continues with back and leg pain.  She is here to discuss details regarding surgical intervention.      Neurologic Exam  Lower Extremity Motor Function     Right  Left    Iliopsoas  5/5  5/5    Quadriceps 5/5 5/5   Tibialis anterior  5/5  5/5    EHL  3/5  5/5    Gastroc. muscle  5/5  5/5                       Decreased right L5 sensation      Assessment/Plan:   Ebonie returns to the office to discuss details of surgical intervention.  She is scheduled for an L4-S1 anterior lumbar interbody fusion with posterior instrumentation and fusion on November 4.  She continues with low back and leg pain that limits her ability to walk for prolonged periods of time. She is currently managing her symptoms with Naproxen, which she will discontinue 10 days prior to surgery.  We reviewed the details of the procedure as well as the risks and expected post-operative course. She provided informed consent.  She expressed concern about taking narcotic pain medications in the post-operative period due to family history of alcoholism.  I reassured her that we will monitor the medication and ensure appropriate prescribing to limit issues with misuse.  She reports urinary hesitancy for well over 10 years.  She denies urinary retention.  We will catheterize her for the duration of the procedure.  I do not expect her to have any issues in the post-op period.   Lastly, she reports having low GFR and elevated creatinine for over 40 years.  She follows with nephrology who is not concerned with her lab work.  She will complete pre-admission testing and obtain medication clearance prior to surgery. She understands that she can reach out with additional questions as needed.     Faviola Dominguez PA-C

## 2021-10-04 NOTE — PROGRESS NOTES
Windy Kitchen presents today for follow up. She continues with back and leg pain.  She is here to discuss details regarding surgical intervention.      Neurologic Exam  Lower Extremity Motor Function     Right  Left    Iliopsoas  5/5  5/5    Quadriceps 5/5 5/5   Tibialis anterior  5/5  5/5    EHL  3/5  5/5    Gastroc. muscle  5/5  5/5                       Decreased right L5 sensation      Assessment/Plan:   Ebonie returns to the office to discuss details of surgical intervention.  She is scheduled for an L4-S1 anterior lumbar interbody fusion with posterior instrumentation and fusion on November 4.  She continues with low back and leg pain that limits her ability to walk for prolonged periods of time. She is currently managing her symptoms with Naproxen, which she will discontinue 10 days prior to surgery.  We reviewed the details of the procedure as well as the risks and expected post-operative course. She provided informed consent.  She expressed concern about taking narcotic pain medications in the post-operative period due to family history of alcoholism.  I reassured her that we will monitor the medication and ensure appropriate prescribing to limit issues with misuse.  She reports urinary hesitancy for well over 10 years.  She denies urinary retention.  We will catheterize her for the duration of the procedure.  I do not expect her to have any issues in the post-op period.  Lastly, she reports having low GFR and elevated creatinine for over 40 years.  She follows with nephrology who is not concerned with her lab work.  She will complete pre-admission testing and obtain medication clearance prior to surgery. She understands that she can reach out with additional questions as needed.     Faviola Dominguez PA-C

## 2021-10-06 ENCOUNTER — TELEPHONE (OUTPATIENT)
Dept: SURGERY | Facility: CLINIC | Age: 71
End: 2021-10-06

## 2021-10-06 NOTE — TELEPHONE ENCOUNTER
Pt called to advise Pre-Op Clearance appointment is scheduled with PCP Dr. Flores on 10/20/21 this was the only appointment PCP has available.

## 2021-10-06 NOTE — H&P
NAME: Windy Kitchen  : 1950  PCP: Francine Flores MD     PRE-OPERATIVE HISTORY AND PHYSICAL EXAM      Chief Complaint: back and leg pain    HPI:  71 y.o. female presenting for their pre-operative history and physical exam.  She will be undergoing an L4-S1 anterior lumbar fusion with posterior instrumentation and fusion on 2021 at [facility] with Dr. Emilio Abad. She continues with back and leg pain.  Her symptoms have been refractory to conservative measures, including oral medications, physical therapy, and epidural injections.     PAST MEDICAL HISTORY:   Past Medical History:   Diagnosis Date   • Basal cell carcinoma of skin    • Chronic kidney disease, stage II (mild)    • Greater trochanteric bursitis of left hip    • Low back pain    • Menopause    • Osteoarthritis    • Spondylolisthesis, lumbosacral region    • Urinary hesitancy        MEDICATIONS:  No current facility-administered medications for this encounter.     Current Outpatient Medications   Medication Sig Dispense Refill   • bimatoprost (LATISSE) 0.03 % ophthalmic solution Administer 1 application into both eyes once daily.     • calcium carbonate-vitamin D3 500 mg(1,250mg) -400 unit chewable tablet Take 1 tablet by mouth once daily.     • naproxen (NAPROSYN) 500 mg tablet Take 500 mg by mouth 2 (two) times a day with meals.     • omeprazole (PriLOSEC) 20 mg capsule Take by mouth once daily.  12   • valACYclovir (VALTREX) 500 mg tablet          PAST SURGICAL HISTORY:  No past surgical history on file.    SOCIAL HISTORY:  Social History     Socioeconomic History   • Marital status:      Spouse name: Not on file   • Number of children: Not on file   • Years of education: Not on file   • Highest education level: Not on file   Occupational History   • Not on file   Tobacco Use   • Smoking status: Never Smoker   • Smokeless tobacco: Never Used   Vaping Use   • Vaping Use: Never used   Substance and Sexual Activity   • Alcohol  use: Not on file   • Drug use: Not on file   • Sexual activity: Not on file   Other Topics Concern   • Not on file   Social History Narrative   • Not on file     Social Determinants of Health     Financial Resource Strain:    • Difficulty of Paying Living Expenses: Not on file   Food Insecurity:    • Worried About Running Out of Food in the Last Year: Not on file   • Ran Out of Food in the Last Year: Not on file   Transportation Needs:    • Lack of Transportation (Medical): Not on file   • Lack of Transportation (Non-Medical): Not on file   Physical Activity:    • Days of Exercise per Week: Not on file   • Minutes of Exercise per Session: Not on file   Stress:    • Feeling of Stress : Not on file   Social Connections:    • Frequency of Communication with Friends and Family: Not on file   • Frequency of Social Gatherings with Friends and Family: Not on file   • Attends Yarsanism Services: Not on file   • Active Member of Clubs or Organizations: Not on file   • Attends Club or Organization Meetings: Not on file   • Marital Status: Not on file   Intimate Partner Violence:    • Fear of Current or Ex-Partner: Not on file   • Emotionally Abused: Not on file   • Physically Abused: Not on file   • Sexually Abused: Not on file   Housing Stability:    • Unable to Pay for Housing in the Last Year: Not on file   • Number of Places Lived in the Last Year: Not on file   • Unstable Housing in the Last Year: Not on file       ALLERGIES:  Allergies   Allergen Reactions   • Scallops Nausea Only   • Shellfish Containing Products      Other reaction(s): GI Upset   • Penicillins Hives       ROS:   Constitutional:  No fever, chills, night sweats, decreased appetite   HEENT No change in vision, no difficulty hearing, no sore throat, no difficulty swallowing   Cardiovascular:  No chest pain, palpitations, heart murmur   Respiratory:  No SOB, coughing, wheezes/rales/rhonchi, chest discomfort or tightness (angina)   Gastrointestinal:  No  "nausea, vomiting, abdominal pain, or liver problems    Genitourinary:  No dysuria or incontinence    Musculoskeletal:  See HPI   Skin:  No rash or erythema   Neurologic:  See HPI   Psychiatric Illness:  No confusion   Hematological/   Lymphatic:  No abnormal bleeding or swollen lymph nodes.    Allergic/Immunologic:  No hay fever and Lupus.      PHYSICAL EXAM:   /80   (BP Location: Right upper arm, Patient Position: Sitting)   Pulse 78   Resp 18   Ht 1.753 m (5' 9\")   Wt 66.2 kg (146 lb)   SpO2 97%   BMI 21.56 kg/m²   BSA 1.8 m²  General:  Well-developed,appears well, no acute distress   HEENT Normocephalic. Sclera nonicteric. Negative for masses, asymmetry and tracheal deviation.    Respiratory:   Clear to ausculation bilaterally. No abnormal effort (use of accessory muscles).    CV:  +S1 and S2, no obvious murmurs. Pulses regular rate.  All extremities warm with brisk capillary refill.   Neurologic:  Alert and oriented to person, place and time.    GI / Abdominal:  Soft. No abdominal masses or tenderness. Nondistended.    Gait & balance No evidence of myelopathic gait.        ASSESSMENT/PLAN:    Ebonie presents for her pre-operative history and physical exam.  She continues with back and leg pain despite conservative treatment, including oral medications, physical therapy, and injections.  She will be undergoing an L4-S1 anterior lumbar interbody fusion with posterior instrumentation and fusion on 11/4/2021 at [facility] with Dr. Emilio Abad.  We discussed the details of the surgery as well as the alternative treatments, risks, and expected post-operative course. SH provided informed consent.  She will complete pre-admission testing, COVID testing, and obtain medical clearance prior to surgery.   She will contact our office with any questions or concerns about the procedure or perioperative course.      I have queried the state Prescription Drug Monitoring Program [PDMP] and found no suspicious PDMP " activity and I will prescribe a controlled medication(s).    Faviola Dominguez PA-C

## 2021-10-11 ENCOUNTER — APPOINTMENT (OUTPATIENT)
Dept: URBAN - METROPOLITAN AREA CLINIC 200 | Age: 71
Setting detail: DERMATOLOGY
End: 2021-10-11

## 2021-10-11 DIAGNOSIS — L57.8 OTHER SKIN CHANGES DUE TO CHRONIC EXPOSURE TO NONIONIZING RADIATION: ICD-10-CM

## 2021-10-11 DIAGNOSIS — L57.0 ACTINIC KERATOSIS: ICD-10-CM

## 2021-10-11 DIAGNOSIS — Z11.52 ENCOUNTER FOR SCREENING FOR COVID-19: ICD-10-CM

## 2021-10-11 PROCEDURE — OTHER SCREENING FOR COVID-19: OTHER

## 2021-10-11 PROCEDURE — 99213 OFFICE O/P EST LOW 20 MIN: CPT | Mod: 25

## 2021-10-11 PROCEDURE — OTHER SUNSCREEN RECOMMENDATIONS: OTHER

## 2021-10-11 PROCEDURE — OTHER LIQUID NITROGEN: OTHER

## 2021-10-11 PROCEDURE — 17000 DESTRUCT PREMALG LESION: CPT

## 2021-10-11 PROCEDURE — 17003 DESTRUCT PREMALG LES 2-14: CPT

## 2021-10-11 PROCEDURE — OTHER COUNSELING: OTHER

## 2021-10-11 ASSESSMENT — LOCATION SIMPLE DESCRIPTION DERM
LOCATION SIMPLE: ABDOMEN
LOCATION SIMPLE: LEFT FOREHEAD
LOCATION SIMPLE: LEFT FOREARM

## 2021-10-11 ASSESSMENT — LOCATION DETAILED DESCRIPTION DERM
LOCATION DETAILED: LEFT DISTAL DORSAL FOREARM
LOCATION DETAILED: PERIUMBILICAL SKIN
LOCATION DETAILED: LEFT SUPERIOR MEDIAL FOREHEAD

## 2021-10-11 ASSESSMENT — LOCATION ZONE DERM
LOCATION ZONE: FACE
LOCATION ZONE: TRUNK
LOCATION ZONE: ARM

## 2021-10-11 ASSESSMENT — PAIN INTENSITY VAS: HOW INTENSE IS YOUR PAIN 0 BEING NO PAIN, 10 BEING THE MOST SEVERE PAIN POSSIBLE?: NO PAIN

## 2021-10-11 NOTE — PROCEDURE: LIQUID NITROGEN
Show Aperture Variable?: Yes
Render Note In Bullet Format When Appropriate: No
Detail Level: Detailed
Post-Care Instructions: I reviewed with the patient in detail post-care instructions. Patient is to wear sunprotection, and avoid picking at any of the treated lesions. Pt may apply Vaseline to crusted or scabbing areas.
Number Of Freeze-Thaw Cycles: 1 freeze-thaw cycle
Duration Of Freeze Thaw-Cycle (Seconds): 2
Consent: The patient's consent was obtained including but not limited to risks of crusting, scabbing, blistering, scarring, darker or lighter pigmentary change, recurrence, incomplete removal and infection.

## 2021-10-27 ENCOUNTER — APPOINTMENT (OUTPATIENT)
Dept: PREADMISSION TESTING | Facility: HOSPITAL | Age: 71
End: 2021-10-27
Payer: MEDICARE

## 2021-10-27 VITALS — HEIGHT: 69 IN | BODY MASS INDEX: 21.33 KG/M2 | WEIGHT: 144 LBS

## 2021-10-27 RX ORDER — POLYETHYLENE GLYCOL 3350 17 G/17G
17 POWDER, FOR SOLUTION ORAL DAILY
COMMUNITY
End: 2021-12-15 | Stop reason: ALTCHOICE

## 2021-10-27 NOTE — PRE-PROCEDURE INSTRUCTIONS
1. We will call you between 3 pm and 7 pm on 11/03/2021 to inform you of arrival time for your procedure. If you do not hear by 6PM, please call [phone] for arrival time.     2. Please arrive through the Main Entrance of the [facility] (across from the parking garage) and report to the Surgery Registration Desk on the day of your procedure.    3. Please follow the following fasting guidelines: No solid food EIGHT HOURS prior to surgery.  Unlimited clear liquids, meaning water or PLAIN black coffee WITHOUT any milk, cream, sugar, or sweetener are permitted up to TWO HOURS prior to arrival at the hospital.    4. Early on the morning of the procedure please take your usual dose of the listed medications with a sip of water:  omeprazole    5. Other Instructions: You may brush your teeth the morning of the procedure. Rinse and spit, do not swallow.  Bring a list of your medications with dosages.  Use surgical wash as directed.     6. If you develop a cold, cough, fever, rash, or other symptom prior to the day of the procedure, please report it to your physician immediately.    7. If you need to cancel the procedure for any reason, please contact your physician.    8. Make arrangements to have someone drive you home from the procedure. If you have not arranged for transportation home, your surgery may be cancelled.     9. You may not take public transportation unless accompanied by a responsible person.    10. You may not drive a car or operate complex or potentially dangerous machinery for 24 hours following anesthesia and/or sedation.    11.  If it is medically necessary for you to have a longer stay, you will be informed as soon as the decision is made.    12. Only bring essential items to the hospital.  Do not wear or bring anything of value to the hospital including jewelry of any kind, money, or wallet. Do not wear make-up or contact lenses.  DO NOT BRING MEDICATIONS FROM HOME. DO bring your hearing aids, glasses,  and a case    13. No lotion, creams, powders, or oils on skin the morning of procedure     14. Dress in comfortable clothes.    15.  If instructed, please bring a copy of your Advanced Directive (Living Will/Durable Power of ) on the day of your procedure.     16. Patients need to quarantine from the time of PAT COVID test to day of surgery, regardless of COVID vaccine status.      17. Ensuring your safety at all times is a very important part of our [facility] Culture of Safety. After having surgery and sedation, you are at risk for falling and balance issues. Although you may feel awake, the effects of the medication can last up to 24 hours after anesthesia. If you need to use the bathroom during your recovery period, nursing staff will escort you there and stay with you to ensure your safety.    Pre operative instructions given as per protocol.  Form explained by: Roxana Triana RN    This was a preanesthesia surgical phone interview. Patient verbalized understanding of above instructions. Patient was given a phone number to be able to call back if any other questions or concerns.

## 2021-10-28 ENCOUNTER — ANESTHESIA EVENT (OUTPATIENT)
Dept: OPERATING ROOM | Facility: HOSPITAL | Age: 71
Setting detail: HOSPITAL OUTPATIENT SURGERY
DRG: 455 | End: 2021-10-28
Payer: MEDICARE

## 2021-11-01 ENCOUNTER — APPOINTMENT (OUTPATIENT)
Dept: LAB | Facility: HOSPITAL | Age: 71
DRG: 455 | End: 2021-11-01
Attending: PHYSICIAN ASSISTANT
Payer: MEDICARE

## 2021-11-01 ENCOUNTER — APPOINTMENT (OUTPATIENT)
Dept: PREADMISSION TESTING | Facility: HOSPITAL | Age: 71
DRG: 455 | End: 2021-11-01
Attending: PHYSICIAN ASSISTANT
Payer: MEDICARE

## 2021-11-01 DIAGNOSIS — M48.062 SPINAL STENOSIS, LUMBAR REGION, WITH NEUROGENIC CLAUDICATION: ICD-10-CM

## 2021-11-01 DIAGNOSIS — M43.16 SPONDYLOLISTHESIS OF LUMBAR REGION: ICD-10-CM

## 2021-11-01 DIAGNOSIS — R79.1 ABNORMAL COAGULATION PROFILE: ICD-10-CM

## 2021-11-01 LAB
ABO + RH BLD: NORMAL
ANION GAP SERPL CALC-SCNC: 11 MEQ/L (ref 3–15)
APTT PPP: 26 SEC (ref 23–35)
BASOPHILS # BLD: 0.04 K/UL (ref 0.01–0.1)
BASOPHILS NFR BLD: 0.7 %
BILIRUB UR QL STRIP.AUTO: NEGATIVE MG/DL
BLD GP AB SCN SERPL QL: NEGATIVE
BLOOD BANK CMNT PATIENT-IMP: NORMAL
BUN SERPL-MCNC: 17 MG/DL (ref 8–20)
CALCIUM SERPL-MCNC: 9.7 MG/DL (ref 8.9–10.3)
CHLORIDE SERPL-SCNC: 105 MEQ/L (ref 98–109)
CLARITY UR REFRACT.AUTO: CLEAR
CO2 SERPL-SCNC: 23 MEQ/L (ref 22–32)
COLOR UR AUTO: YELLOW
CREAT SERPL-MCNC: 1.2 MG/DL (ref 0.6–1.1)
D AG BLD QL: POSITIVE
DIFFERENTIAL METHOD BLD: ABNORMAL
EOSINOPHIL # BLD: 0.08 K/UL (ref 0.04–0.36)
EOSINOPHIL NFR BLD: 1.3 %
ERYTHROCYTE [DISTWIDTH] IN BLOOD BY AUTOMATED COUNT: 12.4 % (ref 11.7–14.4)
GFR SERPL CREATININE-BSD FRML MDRD: 44 ML/MIN/1.73M*2
GLUCOSE SERPL-MCNC: 114 MG/DL (ref 70–99)
GLUCOSE UR STRIP.AUTO-MCNC: NEGATIVE MG/DL
HCT VFR BLDCO AUTO: 45.3 % (ref 35–45)
HGB BLD-MCNC: 15 G/DL (ref 11.8–15.7)
HGB UR QL STRIP.AUTO: NEGATIVE
IMM GRANULOCYTES # BLD AUTO: 0.01 K/UL (ref 0–0.08)
IMM GRANULOCYTES NFR BLD AUTO: 0.2 %
INR PPP: 1
KETONES UR STRIP.AUTO-MCNC: NEGATIVE MG/DL
LABORATORY COMMENT REPORT: NORMAL
LEUKOCYTE ESTERASE UR QL STRIP.AUTO: NEGATIVE
LYMPHOCYTES # BLD: 2.14 K/UL (ref 1.2–3.5)
LYMPHOCYTES NFR BLD: 36 %
MCH RBC QN AUTO: 32.8 PG (ref 28–33.2)
MCHC RBC AUTO-ENTMCNC: 33.1 G/DL (ref 32.2–35.5)
MCV RBC AUTO: 99.1 FL (ref 83–98)
MONOCYTES # BLD: 0.67 K/UL (ref 0.28–0.8)
MONOCYTES NFR BLD: 11.3 %
NEUTROPHILS # BLD: 3.01 K/UL (ref 1.7–7)
NEUTS SEG NFR BLD: 50.5 %
NITRITE UR QL STRIP.AUTO: NEGATIVE
NRBC BLD-RTO: 0 %
PDW BLD AUTO: 10.6 FL (ref 9.4–12.3)
PH UR STRIP.AUTO: 6 [PH]
PLATELET # BLD AUTO: 234 K/UL (ref 150–369)
POTASSIUM SERPL-SCNC: 4.8 MEQ/L (ref 3.6–5.1)
PROT UR QL STRIP.AUTO: NEGATIVE
PROTHROMBIN TIME: 12.6 SEC (ref 12.2–14.5)
RBC # BLD AUTO: 4.57 M/UL (ref 3.93–5.22)
SODIUM SERPL-SCNC: 139 MEQ/L (ref 136–144)
SP GR UR REFRACT.AUTO: 1.01
SPECIMEN EXP DATE BLD: NORMAL
UROBILINOGEN UR STRIP-ACNC: 0.2 EU/DL
WBC # BLD AUTO: 5.95 K/UL (ref 3.8–10.5)

## 2021-11-01 PROCEDURE — 85730 THROMBOPLASTIN TIME PARTIAL: CPT

## 2021-11-01 PROCEDURE — 85610 PROTHROMBIN TIME: CPT

## 2021-11-01 PROCEDURE — U0003 INFECTIOUS AGENT DETECTION BY NUCLEIC ACID (DNA OR RNA); SEVERE ACUTE RESPIRATORY SYNDROME CORONAVIRUS 2 (SARS-COV-2) (CORONAVIRUS DISEASE [COVID-19]), AMPLIFIED PROBE TECHNIQUE, MAKING USE OF HIGH THROUGHPUT TECHNOLOGIES AS DESCRIBED BY CMS-2020-01-R: HCPCS

## 2021-11-01 PROCEDURE — 86850 RBC ANTIBODY SCREEN: CPT

## 2021-11-01 PROCEDURE — 80048 BASIC METABOLIC PNL TOTAL CA: CPT

## 2021-11-01 PROCEDURE — C9803 HOPD COVID-19 SPEC COLLECT: HCPCS

## 2021-11-01 PROCEDURE — 85025 COMPLETE CBC W/AUTO DIFF WBC: CPT

## 2021-11-01 PROCEDURE — 81003 URINALYSIS AUTO W/O SCOPE: CPT

## 2021-11-01 PROCEDURE — 36415 COLL VENOUS BLD VENIPUNCTURE: CPT

## 2021-11-02 LAB — SARS-COV-2 RNA RESP QL NAA+PROBE: NEGATIVE

## 2021-11-04 ENCOUNTER — APPOINTMENT (OUTPATIENT)
Dept: RADIOLOGY | Facility: HOSPITAL | Age: 71
Setting detail: HOSPITAL OUTPATIENT SURGERY
DRG: 455 | End: 2021-11-04
Attending: ORTHOPAEDIC SURGERY
Payer: MEDICARE

## 2021-11-04 ENCOUNTER — ANESTHESIA (OUTPATIENT)
Dept: OPERATING ROOM | Facility: HOSPITAL | Age: 71
Setting detail: HOSPITAL OUTPATIENT SURGERY
DRG: 455 | End: 2021-11-04
Payer: MEDICARE

## 2021-11-04 ENCOUNTER — HOSPITAL ENCOUNTER (INPATIENT)
Facility: HOSPITAL | Age: 71
LOS: 3 days | Discharge: HOME | DRG: 455 | End: 2021-11-07
Attending: ORTHOPAEDIC SURGERY | Admitting: ORTHOPAEDIC SURGERY
Payer: MEDICARE

## 2021-11-04 PROBLEM — K21.9 GERD (GASTROESOPHAGEAL REFLUX DISEASE): Status: ACTIVE | Noted: 2021-11-04

## 2021-11-04 PROBLEM — M43.16 SPONDYLOLISTHESIS OF LUMBAR REGION: Status: ACTIVE | Noted: 2021-11-04

## 2021-11-04 LAB
ABO + RH BLD: NORMAL
D AG BLD QL: POSITIVE
LABORATORY COMMENT REPORT: NORMAL

## 2021-11-04 PROCEDURE — 72100 X-RAY EXAM L-S SPINE 2/3 VWS: CPT

## 2021-11-04 PROCEDURE — 22612 ARTHRD PST TQ 1NTRSPC LUMBAR: CPT | Mod: AS,80 | Performed by: PHYSICIAN ASSISTANT

## 2021-11-04 PROCEDURE — 8E0WXBZ COMPUTER ASSISTED PROCEDURE OF TRUNK REGION: ICD-10-PCS | Performed by: ORTHOPAEDIC SURGERY

## 2021-11-04 PROCEDURE — 0SB20ZZ EXCISION OF LUMBAR VERTEBRAL DISC, OPEN APPROACH: ICD-10-PCS | Performed by: ORTHOPAEDIC SURGERY

## 2021-11-04 PROCEDURE — 22614 ARTHRD PST TQ 1NTRSPC EA ADD: CPT | Mod: AS,80 | Performed by: PHYSICIAN ASSISTANT

## 2021-11-04 PROCEDURE — 0SG30A0 FUSION OF LUMBOSACRAL JOINT WITH INTERBODY FUSION DEVICE, ANTERIOR APPROACH, ANTERIOR COLUMN, OPEN APPROACH: ICD-10-PCS | Performed by: ORTHOPAEDIC SURGERY

## 2021-11-04 PROCEDURE — 22614 ARTHRD PST TQ 1NTRSPC EA ADD: CPT | Performed by: ORTHOPAEDIC SURGERY

## 2021-11-04 PROCEDURE — 25800000 HC PHARMACY IV SOLUTIONS: Performed by: ORTHOPAEDIC SURGERY

## 2021-11-04 PROCEDURE — 22853 INSJ BIOMECHANICAL DEVICE: CPT | Performed by: ORTHOPAEDIC SURGERY

## 2021-11-04 PROCEDURE — 0SG00A0 FUSION OF LUMBAR VERTEBRAL JOINT WITH INTERBODY FUSION DEVICE, ANTERIOR APPROACH, ANTERIOR COLUMN, OPEN APPROACH: ICD-10-PCS | Performed by: ORTHOPAEDIC SURGERY

## 2021-11-04 PROCEDURE — 20930 SP BONE ALGRFT MORSEL ADD-ON: CPT | Performed by: ORTHOPAEDIC SURGERY

## 2021-11-04 PROCEDURE — 25800000 HC PHARMACY IV SOLUTIONS: Performed by: NURSE ANESTHETIST, CERTIFIED REGISTERED

## 2021-11-04 PROCEDURE — 63700000 HC SELF-ADMINISTRABLE DRUG: Performed by: INTERNAL MEDICINE

## 2021-11-04 PROCEDURE — 86891 AUTOLOGOUS BLOOD OP SALVAGE: CPT

## 2021-11-04 PROCEDURE — 20936 SP BONE AGRFT LOCAL ADD-ON: CPT | Performed by: ORTHOPAEDIC SURGERY

## 2021-11-04 PROCEDURE — 25000000 HC PHARMACY GENERAL: Performed by: NURSE ANESTHETIST, CERTIFIED REGISTERED

## 2021-11-04 PROCEDURE — 25800000 HC PHARMACY IV SOLUTIONS: Performed by: PHYSICIAN ASSISTANT

## 2021-11-04 PROCEDURE — 36000015 HC OR LEVEL 5 EA ADDL MIN: Performed by: ORTHOPAEDIC SURGERY

## 2021-11-04 PROCEDURE — 63600000 HC DRUGS/DETAIL CODE: Performed by: NURSE ANESTHETIST, CERTIFIED REGISTERED

## 2021-11-04 PROCEDURE — C1713 ANCHOR/SCREW BN/BN,TIS/BN: HCPCS | Performed by: ORTHOPAEDIC SURGERY

## 2021-11-04 PROCEDURE — 0SG3071 FUSION OF LUMBOSACRAL JOINT WITH AUTOLOGOUS TISSUE SUBSTITUTE, POSTERIOR APPROACH, POSTERIOR COLUMN, OPEN APPROACH: ICD-10-PCS | Performed by: ORTHOPAEDIC SURGERY

## 2021-11-04 PROCEDURE — 25000000 HC PHARMACY GENERAL: Performed by: PHYSICIAN ASSISTANT

## 2021-11-04 PROCEDURE — 27800000 HC SUPPLY/IMPLANTS: Performed by: ORTHOPAEDIC SURGERY

## 2021-11-04 PROCEDURE — 63700000 HC SELF-ADMINISTRABLE DRUG: Performed by: PHYSICIAN ASSISTANT

## 2021-11-04 PROCEDURE — 74018 RADEX ABDOMEN 1 VIEW: CPT

## 2021-11-04 PROCEDURE — 71000001 HC PACU PHASE 1 INITIAL 30MIN: Performed by: ORTHOPAEDIC SURGERY

## 2021-11-04 PROCEDURE — 22585 ARTHRD ANT NTRBD MIN DSC EA: CPT | Mod: 62 | Performed by: ORTHOPAEDIC SURGERY

## 2021-11-04 PROCEDURE — 25000000 HC PHARMACY GENERAL: Performed by: ANESTHESIOLOGY

## 2021-11-04 PROCEDURE — 36000005 HC OR LEVEL 5 INITIAL 30MIN: Performed by: ORTHOPAEDIC SURGERY

## 2021-11-04 PROCEDURE — 71000011 HC PACU PHASE 1 EA ADDL MIN: Performed by: ORTHOPAEDIC SURGERY

## 2021-11-04 PROCEDURE — 63600000 HC DRUGS/DETAIL CODE: Performed by: ANESTHESIOLOGY

## 2021-11-04 PROCEDURE — 61783 SCAN PROC SPINAL: CPT | Performed by: ORTHOPAEDIC SURGERY

## 2021-11-04 PROCEDURE — 97161 PT EVAL LOW COMPLEX 20 MIN: CPT | Mod: GP

## 2021-11-04 PROCEDURE — 0SB40ZZ EXCISION OF LUMBOSACRAL DISC, OPEN APPROACH: ICD-10-PCS | Performed by: ORTHOPAEDIC SURGERY

## 2021-11-04 PROCEDURE — 37000001 HC ANESTHESIA GENERAL: Performed by: ORTHOPAEDIC SURGERY

## 2021-11-04 PROCEDURE — 36415 COLL VENOUS BLD VENIPUNCTURE: CPT | Performed by: ORTHOPAEDIC SURGERY

## 2021-11-04 PROCEDURE — 22612 ARTHRD PST TQ 1NTRSPC LUMBAR: CPT | Performed by: ORTHOPAEDIC SURGERY

## 2021-11-04 PROCEDURE — 12000000 HC ROOM AND CARE MED/SURG

## 2021-11-04 PROCEDURE — 0SG0071 FUSION OF LUMBAR VERTEBRAL JOINT WITH AUTOLOGOUS TISSUE SUBSTITUTE, POSTERIOR APPROACH, POSTERIOR COLUMN, OPEN APPROACH: ICD-10-PCS | Performed by: ORTHOPAEDIC SURGERY

## 2021-11-04 PROCEDURE — 22842 INSERT SPINE FIXATION DEVICE: CPT | Mod: AS,80 | Performed by: PHYSICIAN ASSISTANT

## 2021-11-04 PROCEDURE — 27200000 HC STERILE SUPPLY: Performed by: ORTHOPAEDIC SURGERY

## 2021-11-04 PROCEDURE — 25000000 HC PHARMACY GENERAL: Performed by: ORTHOPAEDIC SURGERY

## 2021-11-04 PROCEDURE — 99232 SBSQ HOSP IP/OBS MODERATE 35: CPT | Performed by: INTERNAL MEDICINE

## 2021-11-04 PROCEDURE — 63600000 HC DRUGS/DETAIL CODE: Performed by: PHYSICIAN ASSISTANT

## 2021-11-04 PROCEDURE — 22842 INSERT SPINE FIXATION DEVICE: CPT | Performed by: ORTHOPAEDIC SURGERY

## 2021-11-04 PROCEDURE — 25800000 HC PHARMACY IV SOLUTIONS: Performed by: ANESTHESIOLOGY

## 2021-11-04 PROCEDURE — 22558 ARTHRD ANT NTRBD MIN DSC LUM: CPT | Mod: 62 | Performed by: ORTHOPAEDIC SURGERY

## 2021-11-04 DEVICE — SCREW RELINE MAS 7.5X50MM 2C POLYAXIAL: Type: IMPLANTABLE DEVICE | Site: SPINE LUMBAR | Status: FUNCTIONAL

## 2021-11-04 DEVICE — GRAFT BONE 2.5CC: Type: IMPLANTABLE DEVICE | Site: SPINE LUMBAR | Status: FUNCTIONAL

## 2021-11-04 DEVICE — MODULUS ALIF BOLT FIXATION 5X22.5MM: Type: IMPLANTABLE DEVICE | Site: SPINE LUMBAR | Status: FUNCTIONAL

## 2021-11-04 DEVICE — IMPLANTABLE DEVICE: Type: IMPLANTABLE DEVICE | Site: SPINE LUMBAR | Status: FUNCTIONAL

## 2021-11-04 DEVICE — SCREW RELINE MAS 7.5X45MM 2C POLYAXIAL: Type: IMPLANTABLE DEVICE | Site: SPINE LUMBAR | Status: FUNCTIONAL

## 2021-11-04 DEVICE — MODULUS ALIF 6X42X32MM 10 DEG: Type: IMPLANTABLE DEVICE | Site: SPINE LUMBAR | Status: FUNCTIONAL

## 2021-11-04 DEVICE — RELINE LOCK SCREW 5.5MM OPEN TULIP: Type: IMPLANTABLE DEVICE | Site: SPINE LUMBAR | Status: FUNCTIONAL

## 2021-11-04 DEVICE — GRAFT BONE 5.0CC: Type: IMPLANTABLE DEVICE | Site: SPINE LUMBAR | Status: FUNCTIONAL

## 2021-11-04 RX ORDER — POLYETHYLENE GLYCOL 3350 17 G/17G
17 POWDER, FOR SOLUTION ORAL DAILY
Status: DISCONTINUED | OUTPATIENT
Start: 2021-11-04 | End: 2021-11-07 | Stop reason: HOSPADM

## 2021-11-04 RX ORDER — DIAZEPAM 5 MG/1
5 TABLET ORAL ONCE AS NEEDED
Status: COMPLETED | OUTPATIENT
Start: 2021-11-04 | End: 2021-11-04

## 2021-11-04 RX ORDER — LIDOCAINE HYDROCHLORIDE 10 MG/ML
INJECTION, SOLUTION INFILTRATION; PERINEURAL AS NEEDED
Status: DISCONTINUED | OUTPATIENT
Start: 2021-11-04 | End: 2021-11-04 | Stop reason: SURG

## 2021-11-04 RX ORDER — FENTANYL CITRATE 50 UG/ML
INJECTION, SOLUTION INTRAMUSCULAR; INTRAVENOUS AS NEEDED
Status: DISCONTINUED | OUTPATIENT
Start: 2021-11-04 | End: 2021-11-04 | Stop reason: SURG

## 2021-11-04 RX ORDER — TRANEXAMIC ACID 10 MG/ML
1000 INJECTION, SOLUTION INTRAVENOUS 2 TIMES DAILY
Status: COMPLETED | OUTPATIENT
Start: 2021-11-04 | End: 2021-11-04

## 2021-11-04 RX ORDER — NEOSTIGMINE METHYLSULFATE 1 MG/ML
INJECTION INTRAVENOUS AS NEEDED
Status: DISCONTINUED | OUTPATIENT
Start: 2021-11-04 | End: 2021-11-04 | Stop reason: SURG

## 2021-11-04 RX ORDER — DEXTROSE 40 %
15-30 GEL (GRAM) ORAL AS NEEDED
Status: DISCONTINUED | OUTPATIENT
Start: 2021-11-04 | End: 2021-11-07 | Stop reason: HOSPADM

## 2021-11-04 RX ORDER — DIAZEPAM 10 MG/2ML
5 INJECTION INTRAMUSCULAR ONCE AS NEEDED
Status: DISCONTINUED | OUTPATIENT
Start: 2021-11-04 | End: 2021-11-04 | Stop reason: HOSPADM

## 2021-11-04 RX ORDER — HYDROMORPHONE HYDROCHLORIDE 1 MG/ML
INJECTION, SOLUTION INTRAMUSCULAR; INTRAVENOUS; SUBCUTANEOUS AS NEEDED
Status: DISCONTINUED | OUTPATIENT
Start: 2021-11-04 | End: 2021-11-04 | Stop reason: SURG

## 2021-11-04 RX ORDER — SODIUM CHLORIDE 9 MG/ML
INJECTION, SOLUTION INTRAVENOUS CONTINUOUS
Status: DISCONTINUED | OUTPATIENT
Start: 2021-11-04 | End: 2021-11-04

## 2021-11-04 RX ORDER — ONDANSETRON HYDROCHLORIDE 2 MG/ML
4 INJECTION, SOLUTION INTRAVENOUS
Status: DISCONTINUED | OUTPATIENT
Start: 2021-11-04 | End: 2021-11-04 | Stop reason: HOSPADM

## 2021-11-04 RX ORDER — SODIUM CHLORIDE 9 MG/ML
INJECTION, SOLUTION INTRAVENOUS CONTINUOUS
Status: DISCONTINUED | OUTPATIENT
Start: 2021-11-04 | End: 2021-11-05

## 2021-11-04 RX ORDER — MIDAZOLAM HYDROCHLORIDE 2 MG/2ML
INJECTION, SOLUTION INTRAMUSCULAR; INTRAVENOUS AS NEEDED
Status: DISCONTINUED | OUTPATIENT
Start: 2021-11-04 | End: 2021-11-04 | Stop reason: SURG

## 2021-11-04 RX ORDER — KETAMINE HYDROCHLORIDE 10 MG/ML
INJECTION, SOLUTION INTRAMUSCULAR; INTRAVENOUS AS NEEDED
Status: DISCONTINUED | OUTPATIENT
Start: 2021-11-04 | End: 2021-11-04 | Stop reason: SURG

## 2021-11-04 RX ORDER — HYDROMORPHONE HYDROCHLORIDE 1 MG/ML
0.4 INJECTION, SOLUTION INTRAMUSCULAR; INTRAVENOUS; SUBCUTANEOUS EVERY 2 HOUR PRN
Status: DISCONTINUED | OUTPATIENT
Start: 2021-11-04 | End: 2021-11-07 | Stop reason: HOSPADM

## 2021-11-04 RX ORDER — IBUPROFEN 200 MG
16-32 TABLET ORAL AS NEEDED
Status: DISCONTINUED | OUTPATIENT
Start: 2021-11-04 | End: 2021-11-07 | Stop reason: HOSPADM

## 2021-11-04 RX ORDER — OXYCODONE HYDROCHLORIDE 5 MG/1
10 TABLET ORAL EVERY 4 HOURS PRN
Status: DISCONTINUED | OUTPATIENT
Start: 2021-11-04 | End: 2021-11-07 | Stop reason: HOSPADM

## 2021-11-04 RX ORDER — EPHEDRINE SULFATE 50 MG/ML
INJECTION, SOLUTION INTRAVENOUS AS NEEDED
Status: DISCONTINUED | OUTPATIENT
Start: 2021-11-04 | End: 2021-11-04 | Stop reason: SURG

## 2021-11-04 RX ORDER — PANTOPRAZOLE SODIUM 20 MG/1
20 TABLET, DELAYED RELEASE ORAL DAILY
Status: DISCONTINUED | OUTPATIENT
Start: 2021-11-04 | End: 2021-11-07 | Stop reason: HOSPADM

## 2021-11-04 RX ORDER — DEXTROSE 50 % IN WATER (D50W) INTRAVENOUS SYRINGE
25 AS NEEDED
Status: DISCONTINUED | OUTPATIENT
Start: 2021-11-04 | End: 2021-11-07 | Stop reason: HOSPADM

## 2021-11-04 RX ORDER — SODIUM CHLORIDE, SODIUM LACTATE, POTASSIUM CHLORIDE, CALCIUM CHLORIDE 600; 310; 30; 20 MG/100ML; MG/100ML; MG/100ML; MG/100ML
INJECTION, SOLUTION INTRAVENOUS
Status: DISCONTINUED | OUTPATIENT
Start: 2021-11-04 | End: 2021-11-04

## 2021-11-04 RX ORDER — ONDANSETRON 4 MG/1
4 TABLET, ORALLY DISINTEGRATING ORAL EVERY 8 HOURS PRN
Status: DISCONTINUED | OUTPATIENT
Start: 2021-11-04 | End: 2021-11-07 | Stop reason: HOSPADM

## 2021-11-04 RX ORDER — ONDANSETRON HYDROCHLORIDE 2 MG/ML
INJECTION, SOLUTION INTRAVENOUS AS NEEDED
Status: DISCONTINUED | OUTPATIENT
Start: 2021-11-04 | End: 2021-11-04 | Stop reason: SURG

## 2021-11-04 RX ORDER — CYCLOBENZAPRINE HCL 10 MG
10 TABLET ORAL 3 TIMES DAILY PRN
Status: DISCONTINUED | OUTPATIENT
Start: 2021-11-04 | End: 2021-11-07 | Stop reason: HOSPADM

## 2021-11-04 RX ORDER — HYDROMORPHONE HYDROCHLORIDE 1 MG/ML
0.5 INJECTION, SOLUTION INTRAMUSCULAR; INTRAVENOUS; SUBCUTANEOUS
Status: DISCONTINUED | OUTPATIENT
Start: 2021-11-04 | End: 2021-11-04 | Stop reason: HOSPADM

## 2021-11-04 RX ORDER — ACETAMINOPHEN 325 MG/1
1000 TABLET ORAL ONCE
Status: COMPLETED | OUTPATIENT
Start: 2021-11-04 | End: 2021-11-04

## 2021-11-04 RX ORDER — GLYCOPYRROLATE 0.6MG/3ML
SYRINGE (ML) INTRAVENOUS AS NEEDED
Status: DISCONTINUED | OUTPATIENT
Start: 2021-11-04 | End: 2021-11-04 | Stop reason: SURG

## 2021-11-04 RX ORDER — CEFAZOLIN SODIUM/WATER 2 G/20 ML
2 SYRINGE (ML) INTRAVENOUS
Status: COMPLETED | OUTPATIENT
Start: 2021-11-04 | End: 2021-11-04

## 2021-11-04 RX ORDER — PHENYLEPHRINE HCL IN 0.9% NACL 50MG/250ML
PLASTIC BAG, INJECTION (ML) INTRAVENOUS CONTINUOUS PRN
Status: DISCONTINUED | OUTPATIENT
Start: 2021-11-04 | End: 2021-11-04 | Stop reason: SURG

## 2021-11-04 RX ORDER — ROCURONIUM BROMIDE 10 MG/ML
INJECTION, SOLUTION INTRAVENOUS AS NEEDED
Status: DISCONTINUED | OUTPATIENT
Start: 2021-11-04 | End: 2021-11-04 | Stop reason: SURG

## 2021-11-04 RX ORDER — OXYCODONE HYDROCHLORIDE 5 MG/1
5 TABLET ORAL EVERY 4 HOURS PRN
Status: DISCONTINUED | OUTPATIENT
Start: 2021-11-04 | End: 2021-11-07 | Stop reason: HOSPADM

## 2021-11-04 RX ORDER — BUPIVACAINE HYDROCHLORIDE AND EPINEPHRINE 5; 5 MG/ML; UG/ML
INJECTION, SOLUTION EPIDURAL; INTRACAUDAL; PERINEURAL
Status: DISCONTINUED | OUTPATIENT
Start: 2021-11-04 | End: 2021-11-04 | Stop reason: HOSPADM

## 2021-11-04 RX ORDER — LIDOCAINE HYDROCHLORIDE ANHYDROUS AND DEXTROSE MONOHYDRATE .8; 5 G/100ML; G/100ML
INJECTION, SOLUTION INTRAVENOUS CONTINUOUS PRN
Status: DISCONTINUED | OUTPATIENT
Start: 2021-11-04 | End: 2021-11-04 | Stop reason: SURG

## 2021-11-04 RX ORDER — FENTANYL CITRATE 50 UG/ML
50 INJECTION, SOLUTION INTRAMUSCULAR; INTRAVENOUS
Status: COMPLETED | OUTPATIENT
Start: 2021-11-04 | End: 2021-11-04

## 2021-11-04 RX ORDER — PROPOFOL 10 MG/ML
INJECTION, EMULSION INTRAVENOUS AS NEEDED
Status: DISCONTINUED | OUTPATIENT
Start: 2021-11-04 | End: 2021-11-04 | Stop reason: SURG

## 2021-11-04 RX ORDER — DEXAMETHASONE SODIUM PHOSPHATE 4 MG/ML
INJECTION, SOLUTION INTRA-ARTICULAR; INTRALESIONAL; INTRAMUSCULAR; INTRAVENOUS; SOFT TISSUE AS NEEDED
Status: DISCONTINUED | OUTPATIENT
Start: 2021-11-04 | End: 2021-11-04 | Stop reason: SURG

## 2021-11-04 RX ORDER — SODIUM CHLORIDE, SODIUM GLUCONATE, SODIUM ACETATE, POTASSIUM CHLORIDE AND MAGNESIUM CHLORIDE 30; 37; 368; 526; 502 MG/100ML; MG/100ML; MG/100ML; MG/100ML; MG/100ML
INJECTION, SOLUTION INTRAVENOUS CONTINUOUS PRN
Status: DISCONTINUED | OUTPATIENT
Start: 2021-11-04 | End: 2021-11-04 | Stop reason: SURG

## 2021-11-04 RX ORDER — LATANOPROST 50 UG/ML
1 SOLUTION/ DROPS OPHTHALMIC NIGHTLY
Status: DISCONTINUED | OUTPATIENT
Start: 2021-11-04 | End: 2021-11-07 | Stop reason: HOSPADM

## 2021-11-04 RX ORDER — ONDANSETRON HYDROCHLORIDE 2 MG/ML
4 INJECTION, SOLUTION INTRAVENOUS EVERY 8 HOURS PRN
Status: DISCONTINUED | OUTPATIENT
Start: 2021-11-04 | End: 2021-11-07 | Stop reason: HOSPADM

## 2021-11-04 RX ORDER — AMOXICILLIN 250 MG
1 CAPSULE ORAL 2 TIMES DAILY
Status: DISCONTINUED | OUTPATIENT
Start: 2021-11-04 | End: 2021-11-07 | Stop reason: HOSPADM

## 2021-11-04 RX ADMIN — ROCURONIUM BROMIDE 20 MG: 10 INJECTION INTRAVENOUS at 10:05

## 2021-11-04 RX ADMIN — HYDROMORPHONE HYDROCHLORIDE 0.5 MG: 1 INJECTION, SOLUTION INTRAMUSCULAR; INTRAVENOUS; SUBCUTANEOUS at 13:34

## 2021-11-04 RX ADMIN — FENTANYL CITRATE 50 MCG: 50 INJECTION INTRAMUSCULAR; INTRAVENOUS at 12:28

## 2021-11-04 RX ADMIN — HYDROMORPHONE HYDROCHLORIDE 0.5 MG: 1 INJECTION, SOLUTION INTRAMUSCULAR; INTRAVENOUS; SUBCUTANEOUS at 12:02

## 2021-11-04 RX ADMIN — FENTANYL CITRATE 50 MCG: 50 INJECTION INTRAMUSCULAR; INTRAVENOUS at 12:43

## 2021-11-04 RX ADMIN — CEFAZOLIN 1 G: 1 INJECTION, POWDER, FOR SOLUTION INTRAMUSCULAR; INTRAVENOUS at 23:07

## 2021-11-04 RX ADMIN — ROCURONIUM BROMIDE 20 MG: 10 INJECTION INTRAVENOUS at 09:24

## 2021-11-04 RX ADMIN — ROCURONIUM BROMIDE 10 MG: 10 INJECTION INTRAVENOUS at 08:41

## 2021-11-04 RX ADMIN — Medication 25 MCG/MIN: at 08:23

## 2021-11-04 RX ADMIN — HYDROMORPHONE HYDROCHLORIDE 0.5 MG: 1 INJECTION, SOLUTION INTRAMUSCULAR; INTRAVENOUS; SUBCUTANEOUS at 13:15

## 2021-11-04 RX ADMIN — OXYCODONE HYDROCHLORIDE 10 MG: 5 TABLET ORAL at 23:00

## 2021-11-04 RX ADMIN — Medication 10 MG: at 10:17

## 2021-11-04 RX ADMIN — HYDROMORPHONE HYDROCHLORIDE 0.5 MG: 1 INJECTION, SOLUTION INTRAMUSCULAR; INTRAVENOUS; SUBCUTANEOUS at 13:00

## 2021-11-04 RX ADMIN — TRANEXAMIC ACID 1000 MG: 100 INJECTION, SOLUTION INTRAVENOUS at 11:27

## 2021-11-04 RX ADMIN — ROCURONIUM BROMIDE 40 MG: 10 INJECTION INTRAVENOUS at 08:10

## 2021-11-04 RX ADMIN — Medication 20 MG: at 08:10

## 2021-11-04 RX ADMIN — LATANOPROST 1 DROP: 50 SOLUTION OPHTHALMIC at 21:43

## 2021-11-04 RX ADMIN — HYDROMORPHONE HYDROCHLORIDE 0.5 MG: 1 INJECTION, SOLUTION INTRAMUSCULAR; INTRAVENOUS; SUBCUTANEOUS at 11:07

## 2021-11-04 RX ADMIN — FENTANYL CITRATE 50 MCG: 50 INJECTION INTRAMUSCULAR; INTRAVENOUS at 08:10

## 2021-11-04 RX ADMIN — SODIUM CHLORIDE: 9 INJECTION, SOLUTION INTRAVENOUS at 14:34

## 2021-11-04 RX ADMIN — LIDOCAINE HYDROCHLORIDE 3 ML: 10 INJECTION, SOLUTION INFILTRATION; PERINEURAL at 08:10

## 2021-11-04 RX ADMIN — CEFAZOLIN 1 G: 1 INJECTION, POWDER, FOR SOLUTION INTRAMUSCULAR; INTRAVENOUS at 16:41

## 2021-11-04 RX ADMIN — Medication 10 MG: at 09:17

## 2021-11-04 RX ADMIN — DEXAMETHASONE SODIUM PHOSPHATE 10 MG: 4 INJECTION, SOLUTION INTRA-ARTICULAR; INTRALESIONAL; INTRAMUSCULAR; INTRAVENOUS; SOFT TISSUE at 08:42

## 2021-11-04 RX ADMIN — Medication 4 MG: at 11:37

## 2021-11-04 RX ADMIN — Medication 2 G: at 08:17

## 2021-11-04 RX ADMIN — ONDANSETRON 4 MG: 2 INJECTION INTRAMUSCULAR; INTRAVENOUS at 11:34

## 2021-11-04 RX ADMIN — ACETAMINOPHEN 975 MG: 325 TABLET, FILM COATED ORAL at 07:06

## 2021-11-04 RX ADMIN — FENTANYL CITRATE 50 MCG: 50 INJECTION INTRAMUSCULAR; INTRAVENOUS at 08:05

## 2021-11-04 RX ADMIN — REMIFENTANIL HYDROCHLORIDE 0.1 MCG/KG/MIN: 1 INJECTION, POWDER, LYOPHILIZED, FOR SOLUTION INTRAVENOUS at 08:16

## 2021-11-04 RX ADMIN — GLYCOPYRROLATE 0.6 MG: 0.2 INJECTION, SOLUTION INTRAMUSCULAR; INTRAVITREAL at 11:37

## 2021-11-04 RX ADMIN — EPHEDRINE SULFATE 10 MG: 50 INJECTION, SOLUTION INTRAVENOUS at 10:31

## 2021-11-04 RX ADMIN — EPHEDRINE SULFATE 10 MG: 50 INJECTION, SOLUTION INTRAVENOUS at 08:35

## 2021-11-04 RX ADMIN — FENTANYL CITRATE 50 MCG: 50 INJECTION INTRAMUSCULAR; INTRAVENOUS at 12:58

## 2021-11-04 RX ADMIN — DIAZEPAM 5 MG: 5 TABLET ORAL at 12:58

## 2021-11-04 RX ADMIN — LIDOCAINE HYDROCHLORIDE 1 MG/MIN: 8 INJECTION, SOLUTION INTRAVENOUS at 08:16

## 2021-11-04 RX ADMIN — TRANEXAMIC ACID 1000 MG: 100 INJECTION, SOLUTION INTRAVENOUS at 08:17

## 2021-11-04 RX ADMIN — FENTANYL CITRATE 50 MCG: 50 INJECTION INTRAMUSCULAR; INTRAVENOUS at 12:13

## 2021-11-04 RX ADMIN — SODIUM CHLORIDE: 9 INJECTION, SOLUTION INTRAVENOUS at 07:09

## 2021-11-04 RX ADMIN — PROPOFOL 180 MG: 10 INJECTION, EMULSION INTRAVENOUS at 08:10

## 2021-11-04 RX ADMIN — SODIUM CHLORIDE, SODIUM GLUCONATE, SODIUM ACETATE, POTASSIUM CHLORIDE AND MAGNESIUM CHLORIDE: 526; 502; 368; 37; 30 INJECTION, SOLUTION INTRAVENOUS at 08:20

## 2021-11-04 RX ADMIN — MIDAZOLAM HYDROCHLORIDE 2 MG: 1 INJECTION, SOLUTION INTRAMUSCULAR; INTRAVENOUS at 08:00

## 2021-11-04 ASSESSMENT — COGNITIVE AND FUNCTIONAL STATUS - GENERAL
AFFECT: WFL;LOW AROUSAL/LETHARGIC
MOVING TO AND FROM BED TO CHAIR: 2 - A LOT
WALKING IN HOSPITAL ROOM: 2 - A LOT
CLIMB 3 TO 5 STEPS WITH RAILING: 2 - A LOT
STANDING UP FROM CHAIR USING ARMS: 2 - A LOT

## 2021-11-04 ASSESSMENT — PATIENT HEALTH QUESTIONNAIRE - PHQ9: SUM OF ALL RESPONSES TO PHQ9 QUESTIONS 1 & 2: 0

## 2021-11-04 NOTE — OP NOTE
OPERATIVE REPORT          PREOPERATIVE DIAGNOSIS(ES):  Lumbo-Sacral spine disease      POSTOPERATIVE DIAGNOSIS(ES):  Lumbo-Sacral spine disease      PROCEDURE:  Anterior lumbo-sacral spine exposure from the level of L4 to S1      ANESTHESIA:  General endotracheal      SURGEON:  Devyn Hill MD    CO-SURGEON:  Emilio Abad M.D.    ASSISTANT: None      ESTIMATED BLOOD LOSS:  Minimal      SPECIMENS:  None.      COMPLICATIONS:  None.      DISPOSITION:  Stable at end of procedure.      INDICATION FOR PROCEDURE:  This is a 71 y.o. -year-old female  who was scheduled to undergo anterior and posterior lumbo-sacral spine surgery by the Spine Team at [facility].  We were consulted for anterior  exposure of the lumbar spine from the level of L4 to S1.  I had a thorough  discussion with the patient regarding the nature of procedure.  After explaining the risks and benefits, and providing her ample time to ask questions, she freely consented to proceeding with surgery.      DETAILS OF THE PROCEDURE:  The patient was brought to the operating room  and identified as Windy Kitchen.  The patient was transferred to the operating table and placed in supine position with her arms abducted on arm boards at 70 degrees bilaterally.  The patient was padded appropriately to prevent position injury.  After the successful induction of general anesthesia, the patient was prepped and draped in the usual sterile fashion.  A formal time-out was held, in which all members of the operating room team agreed upon the patient’s identification, the site of surgery, the procedure to be performed, the operating surgeon, and that perioperative antibiotics were administered.  We also confirmed that the patient had a pulse-oximeter on her left great toe.  We began the procedure by creating a vertical midline incision with a #10  blade scalpel.  The Bovie electrocautery was used to carry the dissection down  through the  dermis and subcutaneous tissues to the level of the anterior  rectus sheath.  The anterior rectus sheath was divided in midline.  The preperitoneal fat was  and the peritoneum was identified and preserved.  We then  carried our dissection in the left lateral direction in the extra-peritoneal  plane.  The peritoneum and its contents were mobilized in a left lateral to  right medial direction.  The left inferior epigastric vessels were identified and preserved as was their relationship to the posterior surface of the left rectus abdominus muscle.    The left iliopsoas muscle and genitofemoral nerve were identified and preserved.  The left ureter was also identified and preserved. A MAS-A.L.I.F. retractor was set up to help  with our exposure.  We identified the aorta as well as the lumbar spine.  A peanut dissector was used to clear the soft tissues off the anterior surface of the lumbar spine to  the left of the aorta and superior to the iliac bifurcation.  The segmental  vessels at the L4 level were ligated with Hemo-clips and divided.  The ileo-lumbar vein was ligated with a #0 silk tie and Hemo-clips and divided.  The aorta and iliac vessels were mobilized toward the right side of the patient's spine.  We then turned our attention to the sacral promontory, immediately inferior to the iliac bifurcation.  A peanut dissector was used to clear the soft tissues off of the anterior surface of the sacral promontory.  The median sacral vessels were identified, ligated with Hemo-clips, and divided.  A radiographic marker was placed in the L4/L5  intervertebral disk, and a fluoroscopy of the abdomen was obtained to confirm our anatomic level.  At this point in the operation, the spine team performed their part of the procedure. Please see the separately dictated operative note from the spine team for further details of that procedure.      Following the spine team's procedure, we performed abdominal wall  closure.  The operative field was inspected and demonstrated no signs of active  bleeding.  The retractors were removed, allowing the peritoneum to return  turn to its normal anatomic location.  #1 PDS sutures were placed in  a running fashion to approximate the rectus fascia.  A #3-0  v-lock suture was placed in a running sub-cuticular fashion for skin  closure.  Dressings were applied at the end of the procedure.  All sponge  and instrument counts were correct x2 at the end of procedure and I was  present and participated fully.  The patient tolerated the procedure welland  without complications.

## 2021-11-04 NOTE — ANESTHESIOLOGIST PRE-PROCEDURE ATTESTATION
Pre-Procedure Patient Identification:  I am the Primary Anesthesiologist and have identified the patient on 11/04/21 at 7:21 AM.   I have confirmed the procedure(s) will be performed by the following surgeon/proceduralist Emilio Abad MD.

## 2021-11-04 NOTE — ANESTHESIA POSTPROCEDURE EVALUATION
Patient: Windy Kitchen    Procedure Summary     Date: 11/04/21 Room / Location:  OR 6 / [facility] OR    Anesthesia Start: 0803 Anesthesia Stop: 1204    Procedure: L4-S1 anterior lumbar interbody fusion (ALIF) with posterior instrumentation and fusion with iFactor. (N/A Spine Lumbar) Diagnosis:       Spondylolisthesis of lumbar region      Spinal stenosis, lumbar region, with neurogenic claudication      (Spondylolisthesis of lumbar region [M43.16])      (Spinal stenosis, lumbar region, with neurogenic claudication [M48.062])    Surgeons: Emilio Abad MD Responsible Provider: David Polo MD    Anesthesia Type: general ASA Status: 2          Anesthesia Type: general  PACU Vitals  11/4/2021 1200 - 11/4/2021 1254      11/4/2021  1205 11/4/2021  1215 11/4/2021  1230 11/4/2021  1245    BP: 136/64 127/62 137/66 --    Temp: 36.1 °C (97 °F) -- -- --    Pulse: 74 68 66 --    Resp: 12 12 12 14    SpO2: 98 % 100 % 100 % --            Anesthesia Post Evaluation    Pain management: adequate  Patient participation: complete - patient participated  Level of consciousness: awake and alert  Cardiovascular status: acceptable  Airway Patency: adequate  Respiratory status: acceptable  Hydration status: acceptable  Anesthetic complications: no

## 2021-11-04 NOTE — ANESTHESIA PROCEDURE NOTES
Airway  Urgency: elective    Start Time: 11/4/2021 8:10 AM  Airway not difficult    General Information and Staff    Patient location during procedure: OR  Anesthesiologist: David Polo MD  Resident/CRNA: Sarah Elam CRNA  Performed: resident/CRNA     Indications and Patient Condition  Indications for airway management: anesthesia  Sedation level: deep  Preoxygenated: yes  Patient position: sniffing  MILS not maintained throughout  Mask difficulty assessment: 1 - vent by mask    Final Airway Details  Final airway type: endotracheal airway      Successful airway: ETT    Successful intubation technique: direct laryngoscopy  Facilitating devices/methods: intubating stylet  Endotracheal tube insertion site: oral  Blade: Castro  Blade size: #2  ETT size (mm): 7.0  Cormack-Lehane Classification: grade I - full view of glottis  Placement verified by: chest auscultation and capnometry   Measured from: lips  ETT to lips (cm): 21  Number of attempts at approach: 1  Number of other approaches attempted: 0  Atraumatic airway insertion

## 2021-11-04 NOTE — PROGRESS NOTES
Patient: Windy Kitchen  Location:  [place] [address]  MRN:  [id number]  Today's date:  11/4/2021    Patient left end of session resting comfortably in locked recliner w/ +chair alarm, LE elevated, +SCDs, call bell and all needs in reach,  at bedside. Discussed PT evaluation outcomes and recommendations w/ RN.    Ebonie is a 71 y.o. female admitted on 11/4/2021 with Spondylolisthesis of lumbar region [M43.16]  Spinal stenosis, lumbar region, with neurogenic claudication [M48.062]. Principal problem is Spinal stenosis, lumbar region, with neurogenic claudication.    Past Medical History  Ebonie has a past medical history of Basal cell carcinoma of skin, Bronchitis, Chronic kidney disease, stage II (mild), Constipation, Genital herpes simplex, Greater trochanteric bursitis of left hip, Low back pain, Menopause, Motion sickness, Osteoarthritis, Spondylolisthesis, Spondylolisthesis, lumbosacral region, and Urinary hesitancy.    History of Present Illness   71-year-old female w/ PMH as listed above presents POD #0 s/p elective L4-S1 anterior lumbar interbody fusion with posterior instrumentation and spinal fusion secondary to lumbar spondylolisthesis, symptoms have been refractory to conservative measures, including oral medications, physical therapy, and epidural injections.      PT Vitals    Date/Time Pulse SpO2 Pt Activity O2 Therapy BP BP Location BP Method Pt Position Observations [facility]   11/04/21 1550 72 99 % At rest None (Room air) 153/76 Left upper arm Automatic Lying -- MTU   11/04/21 1556 72 -- -- -- 145/72 Left upper arm Automatic Standing +dizzy and lightheaded MTU   11/04/21 1608 84 99 % At rest None (Room air) 144/73 Left upper arm Automatic Sitting in recliner w/ LE elevated MTU      PT Pain    Date/Time Pain Type Side/Orientation Location Rating: Rest Radiation to Description [facility]   11/04/21 1550 Pain Assessment generalized back 3 leg, right aching MTU          Prior Living Environment      Most  Recent Value   People in Home spouse   Current Living Arrangements home   Living Environment Comment lives w/  (works from home) in senior living community. first floor set-up w/ walk-in shower, shower chair, full flight of stairs to finished basement.        Prior Level of Function      Most Recent Value   Dominant Hand right   Ambulation independent   Transferring independent   Toileting independent   Bathing assistive equipment   Dressing independent   Eating independent   Communication understands/communicates without difficulty   Prior Level of Function Comment Patient is retired , 2 dogs at home, rides horses in her spare time. drives. independent w/ ADLs. ambulation limited to approximately 1/4 mile per  due to radicular pain.   Assistive Device Currently Used at Home shower chair           PT Evaluation and Treatment - 21 1550        PT Time Calculation    Start Time 1550     Stop Time 1608     Time Calculation (min) 18 min        Session Details    Document Type initial evaluation     Mode of Treatment physical therapy        General Information    Patient Profile Reviewed yes     Onset of Illness/Injury or Date of Surgery 21     Referring Physician Emilio Abad MD     Patient/Family/Caregiver Comments/Observations Patient cleared by RN for PT evaluation.     General Observations of Patient Patient received in bed w/ +bed alarm, +IV LUE, +SCDs, name and  verified on hospital wristband, agreeable to PT evaluation.     Existing Precautions/Restrictions fall;spinal     Limitations/Impairments safety/cognitive        Living Environment    Primary Care Provided by self        Cognition/Psychosocial    Affect/Mental Status (Cognition) WFL;low arousal/lethargic     Orientation Status (Cognition) oriented x 4     Follows Commands (Cognition) WFL;follows one-step commands;over 90% accuracy     Cognitive Function WFL;safety deficit     Safety Deficit (Cognition)  minimal deficit;impulsivity;insight into deficits/self-awareness;awareness of need for assistance     Comment, Cognition Patient became increasingly lethargic during evaluation, repeatedly closing eyes and falling asleep. vitals stable. RN aware.        Sensory    Hearing Status WNL        Vision Assessment/Intervention    Visual Impairment/Limitations WFL;corrective lenses for reading        Sensory Assessment (Somatosensory)    Sensory Assessment (Somatosensory) sensation intact;bilateral LE     Bilateral LE Sensory Assessment general sensation;light touch awareness;light touch localization        Range of Motion (ROM)    Range of Motion ROM is WFL;bilateral lower extremities        Strength (Manual Muscle Testing)    Strength (Manual Muscle Testing) strength is WFL;bilateral lower extremities   at least 3/5 based on ROM and functional assessment    Ankle, Left (Strength) 5/5     Ankle, Right (Strength) DF 3/5, PF 5/5        Bed Mobility    [place], Roll Left close supervision;verbal cues     Verbal Cues (Roll Left) safety;technique;maintaining precautions     [place], Supine to Sit close supervision;verbal cues     Verbal Cues (Supine to Sit) hand placement;maintaining precautions;safety;technique     Assistive Device bed rails     Comment (Bed Mobility) log-rolling for spinal precautions.        Bed to Chair Transfer    [place], Bed to Chair moderate assist (50-74% patient effort);1 person assist;verbal cues;safety considerations     Verbal Cues safety;technique     Assistive Device --   hand-hold assist    Comment cues for sequencing and safe positioning to chair        Sit to Stand Transfer    [place], Sit to Stand Transfer minimum assist (75% or more patient effort);1 person assist;verbal cues;safety considerations     Verbal Cues safety;technique     Assistive Device other (see comments)   hand-hold assist    Comment from edge of bed        Stand to Sit Transfer    [place], Stand to  Sit Transfer minimum assist (75% or more patient effort);1 person assist;verbal cues;nonverbal cues (demo/gesture)     Verbal Cues hand placement;safety;technique     Assistive Device other (see comments)   hand-hold assist    Comment to recliner, good eccentric control w/ UE support        Gait Training    [place], Gait moderate assist (50-74% patient effort);1 person assist;verbal cues;safety considerations     Assistive Device other (see comments)   hand-hold assist    Distance in Feet 5 feet     Pattern (Gait) step-to     Deviations/Abnormal Patterns (Gait) base of support, narrow;edilma decreased;gait speed decreased;step length decreased     Comment (Gait/Stairs) 5 ft bed to chair, hand-hold assist and posterior support from PT due to instability and postural sway. no LOB or LE buckling. deferred further ambulation assessment due to instability and worsening lethargy/ alertness, may recommend RW for stability, will assess further next session.        Stairs Training    [place], Stairs unable to assess     Comment deferred for patient safety due to lethargy and decreased alertness, will assess as able and appropriate.        Safety Issues, Functional Mobility    Safety Issues Affecting Function (Mobility) impulsivity;insight into deficits/self-awareness;judgment;sequencing abilities     Impairments Affecting Function (Mobility) balance;endurance/activity tolerance;pain;strength     Comment, Safety Issues/Impairments (Mobility) impulsive w/ initiating transfer to chair, increased lethargy w/ patient repeatedly falling asleep during evaluation.        Balance    Balance Assessment sitting static balance;sitting dynamic balance;sit to stand dynamic balance;standing static balance;standing dynamic balance     Static Sitting Balance mild impairment;sitting, edge of bed     Dynamic Sitting Balance mild impairment;sitting, edge of bed     Sit to Stand Dynamic Balance moderate impairment;supported    hand-hold assist    Static Standing Balance moderate impairment;supported   hand-hold assist    Dynamic Standing Balance moderate impairment;supported   hand-hold assist       AM-PAC (TM) - Mobility (Current Function)    Turning from your back to your side while in a flat bed without using bedrails? 4 - None     Moving from lying on your back to sitting on the side of a flat bed without using bedrails? 3 - A Little     Moving to and from a bed to a chair? 2 - A Lot     Standing up from a chair using your arms? 2 - A Lot     To walk in a hospital room? 2 - A Lot     Climbing 3-5 steps with a railing? 2 - A Lot     AM-PAC (TM) Mobility Score 15        Assessment/Plan (PT)    Daily Outcome Statement PT evaluation completed. Supervision for bed mobility w/ verbal cues for spinal precuations, able to maintain. ModA x1 w/ hand-hold assist for sit<>stand transfers and 5 ft ambulation to chair due to instability and balance impairments. Limited by increasing lethargy and decreased SCOTT, vitals stable. Further assessment deferred as patient unable to consistently meaningfully participate. Anticipate she will be functionally safe to return home w/ family assist and home PT if necessary when medically cleared. PT will continue to follow. AM-PAC 15/24.     Rehab Potential good, to achieve stated therapy goals     Therapy Frequency 5-7 times/wk     Planned Therapy Interventions balance training;bed mobility training;gait training;patient/family education;stair training;transfer training               PT Assessment/Plan      Most Recent Value   PT Recommended Discharge Disposition home with assistance, home with home health at 11/04/2021 1550   Anticipated Equipment Needs at Discharge (PT) --  [TBD pending progress] at 11/04/2021 1550   Patient/Family Therapy Goals Statement To figure out what I can and can't do. at 11/04/2021 1550                    Education Documentation  Signs/Symptoms, taught by Kelly Aguayo, PT at  11/4/2021  4:50 PM.  Learner: Patient  Readiness: Acceptance  Method: Explanation  Response: Verbalizes Understanding, Needs Reinforcement  Comment: PT role and POC, discharge goals and recommendations, spinal precautions, staff assist for safety and fall prevention during hospital stay. Recommend review of all next session due to post-op lethargy during IE.    Risk Factors, taught by Kelly Aguayo PT at 11/4/2021  4:50 PM.  Learner: Patient  Readiness: Acceptance  Method: Explanation  Response: Verbalizes Understanding, Needs Reinforcement  Comment: PT role and POC, discharge goals and recommendations, spinal precautions, staff assist for safety and fall prevention during hospital stay. Recommend review of all next session due to post-op lethargy during IE.    Fall Prevention, taught by Kelly Aguayo PT at 11/4/2021  4:50 PM.  Learner: Patient  Readiness: Acceptance  Method: Explanation  Response: Verbalizes Understanding, Needs Reinforcement  Comment: PT role and POC, discharge goals and recommendations, spinal precautions, staff assist for safety and fall prevention during hospital stay. Recommend review of all next session due to post-op lethargy during IE.    Call Light Use, taught by Kelly Aguayo PT at 11/4/2021  4:50 PM.  Learner: Patient  Readiness: Acceptance  Method: Explanation  Response: Verbalizes Understanding, Needs Reinforcement  Comment: PT role and POC, discharge goals and recommendations, spinal precautions, staff assist for safety and fall prevention during hospital stay. Recommend review of all next session due to post-op lethargy during IE.    Activity, taught by Kelly Aguayo PT at 11/4/2021  4:50 PM.  Learner: Patient  Readiness: Acceptance  Method: Explanation  Response: Verbalizes Understanding, Needs Reinforcement  Comment: PT role and POC, discharge goals and recommendations, spinal precautions, staff assist for safety and fall prevention during hospital stay.  Recommend review of all next session due to post-op lethargy during IE.          PT Goals      Most Recent Value   Bed Mobility Goal 1    Activity/Assistive Device rolling to left, rolling to right, sit to supine/supine to sit at 11/04/2021 1550   [place] independent at 11/04/2021 1550   Time Frame by discharge at 11/04/2021 1550   Progress/Outcome goal ongoing at 11/04/2021 1550   Transfer Goal 1    Activity/Assistive Device sit-to-stand/stand-to-sit, bed-to-chair/chair-to-bed at 11/04/2021 1550   [place] modified independence  [w/ least restrictive device] at 11/04/2021 1550   Time Frame by discharge at 11/04/2021 1550   Progress/Outcome goal ongoing at 11/04/2021 1550   Gait Training Goal 1    Activity/Assistive Device gait (walking locomotion) at 11/04/2021 1550   [place] modified independence  [w/ least restrictive device] at 11/04/2021 1550   Distance 200 ft at 11/04/2021 1550   Time Frame by discharge at 11/04/2021 1550   Progress/Outcome goal ongoing at 11/04/2021 1550   Stairs Goal 1    Activity/Assistive Device ascending stairs, descending stairs, step-to-step at 11/04/2021 1550   [place] supervision required at 11/04/2021 1550   Number of Stairs 12 at 11/04/2021 1550   Time Frame by discharge at 11/04/2021 1550   Progress/Outcome goal ongoing at 11/04/2021 1550

## 2021-11-04 NOTE — HOSPITAL COURSE
Ebonie is a 71 y.o. female admitted on 11/4/2021 with Spondylolisthesis of lumbar region [M43.16]  Spinal stenosis, lumbar region, with neurogenic claudication [M48.062]. Principal problem is Spinal stenosis, lumbar region, with neurogenic claudication.    Past Medical History  Ebonie has a past medical history of Basal cell carcinoma of skin, Bronchitis, Chronic kidney disease, stage II (mild), Constipation, Genital herpes simplex, Greater trochanteric bursitis of left hip, Low back pain, Menopause, Motion sickness, Osteoarthritis, Spondylolisthesis, Spondylolisthesis, lumbosacral region, and Urinary hesitancy.    History of Present Illness   71-year-old female w/ PMH as listed above presents POD #0 s/p elective L4-S1 anterior lumbar interbody fusion with posterior instrumentation and spinal fusion secondary to lumbar spondylolisthesis, symptoms have been refractory to conservative measures, including oral medications, physical therapy, and epidural injections.

## 2021-11-04 NOTE — DISCHARGE INSTRUCTIONS
Lumbar or Thoracic Fusion     PLEASE DO NOT TAKE ANY NON-STEROIDAL ANTI-INFLAMMATORY DRUGS (NSAIDs like Advil, Celebrex, Ibuprofen, Motrin, Vioxx, Naprosyn, Aleve, etc) OR ASPIRIN PRODUCTS FOR 10 DAYS BEFORE SURGERY. These medications can increase bleeding during surgery. If you absolutely need to be on these medications until the date of surgery, check with your surgeon.     Your Spine Problem   There may be two separate problems in your spine. First is spinal stenosis, a condition in which the spinal nerve roots are compressed by degenerated (“worn out”) portions of the lumbar spine, such as bone spurs or bulging discs. The compression of the nerve roots can cause symptoms such as pain, numbness, tingling or weakness of the buttocks and legs.   The second problem may be an instability of one vertebra on another or, arthritis (disc degeneration), or scoliosis. These problems can make the spinal stenosis worse as well as cause pain.     Your surgery   The surgery is designed to address each of the problems present. Not every patient has both problems - the surgery is tailored to your particular problems as discussed with you by your surgeon.   If you have spinal stenosis, Dr. Abad can often relieve the pressure on your nerves through realigning the bone in your spine as part of the spinal fusion.  Occasionally he will also need to directly remove the portions of the lumbar spine (typically bone spurs) that are causing compression on your nerves.  If this is necessary it is called a “decompression” or “laminectomy.” The entire spine is not removed-only the areas compressing the nerve root are removed. The decompression helps to free up space for the nerve roots so that they are no longer “pinched.”     The fusion is generally necessary if you have spinal instability (abnormal shifting of the bones in the spine), severely degenerated discs or scoliosis.  This is often done in a minimally invasive fashion that  avoids all of the muscle damage seen with traditional surgery.  The fusion involves replacing the disc material with bone graft and often a 'spacer' or interbody device.  We also often use metal implants (usually screws and rods) to connect the problem vertebrae.  Using the implants and the bone graft, a proper environment is created so that the problem segments will fuse together and heal into one bone. The entire spine is not fused! Only those segments causing the problem are fused. It can take up to one year for the fusion to completely heal. During that time, you may progressively increase your activities under our surgeon's guidance. However, you should always be careful to ensure that the fusion heals properly. You will be given information on what you can and can't do after surgery.     Incision   Depending on the specific technique that Dr. Abad selects, you may have an incision on your abdomen (anterior), on your side (lateral) or on your back.  Some minimally invasive techniques require more than one incision.  The size of the incision depends on many factors including the number of levels requiring decompression and your body weight. There will generally not be any stitches to remove.   In some instances there will be a small plastic drain that comes out near the wound. Its purpose is to keep blood clots from pooling in the wound. Usually, your surgeon's team will remove the drain on day two to three after surgery, but it may be pulled out earlier or later depending on how much comes out of it.     Your Hospital Stay   After surgery, you will be taken to the anesthesia recovery room. When you are awake after anesthesia, usually about 1-3 hours later, you will then go to your hospital suite. In most cases, your family may be able to see you once you have suf iciently awakened from anesthesia in the recovery room, as well as once you arrive in your suite.     Diet   Initially, you will only be given ice  chips to eat. This is because occasionally your GI tract will take some time to regain full function following anesthesia.  If this occurs it is called an “ileus”, and it generally resolves within a few days. Factors that prolong the period of ileus include: taking high doses of narcotic pain medications and physical inactivity. On the other hand, getting off IV pain medications as soon as it is reasonable and walking as much as possible will help the ileus to resolve. Your ileus resolves when you pass flatus (gas) from below. At this point, you can eat regular food.     Physical Therapy   You will participate in physical therapy as early as the day of surgery. This is extremely important to your overall recovery from surgery for a number of reasons. Getting out of bed is good for your lungs; it prevents blood clots from forming in your legs, and speeds your recovery.   However, until your surgeon says otherwise, the only physical therapy you should do initially is walking. No strengthening or stretching of the lower back is necessary. These may actually be harmful unless prescribed to you later at an appropriate time by our surgeon. Some people will need to use a walker during their initial recovery period, others may not. 3     Going home   Your length of stay in the hospital depends on many factors, including your general medical condition and the severity of your spine problem. Most patients who have a one level fusion can go home in one to three days. If more levels need treatment, your hospital stay may be longer.   You can go home when: 1) you are taking oral pain pills; AND 2) you can eat and drink enough to sustain yourself (don't worry -- most people will not feel like eating and drinking too much after surgery, and that is ok); AND 3) you are able to get out of bed and walk around. Having a bowel movement is not necessary before going home.   In some instances patients may need to go to a rehabilitation  facility first before going home. There they can build up their strength until they are ready to go home.     What to do when you get home   Instructions on “do's and don'ts” once you get home are on the next page. 4             WHAT TO DO AFTER YOUR SURGERY  (LUMBAR OR THORACIC FUSION)  Wound Care   -?Keep your incision clean and dry.   -?There are no stitches to remove, unless you have been told otherwise. All of the stitches are “inside.”   -?If the wound is dry, no further dressings are needed and the incision can be left open to air. If there is some drainage, the wound can be covered with a clean dressing as needed.   -?You may shower when the wound has been clean and dry for 24 hours. However, do not soak the wound in a bathtub or pool. Gently clean your wound - do not scrub it vigorously until it is completed healed.   -?If you have them, let the Steri-strips (the tape on your incision) fall off by themselves. If some are still there by the end of two weeks, you may peel them off.   -?Do NOT put any ointments or antibacterial solutions over the incision or Steri-strips.   -?If you notice any drainage, redness, swelling, or increased pain at the incision, call the office.     Activities   -?Walking is the best activity. Walk as much as you like. It is good for you and will help you recover more quickly.   -?AVOID excessive BLTs: Bending, Lifting, and Twisting of your lower back. However, you may exercise your arms and legs with light weights (5-10 pounds) if desired as soon as you feel like it -- as long as those activities do not cause you to perform too much BLTs on your lower back.   -?Do not try to do too much too early (e.g. heavy housework such as making beds, laundry, vacuuming). Use your common sense. Again, walking is the best activity, and we encourage you to walk.   -?If you have been given physical therapy visits at home, these are for safety and help with walking/ getting around, not for doing  the “BLTs”.     Medications   -?PLEASE DO NOT TAKE ANY NON-STEROIDAL ANTI-INFLAMMATORY DRUGS (NSAIDs like Advil, Celebrex, Aleve, Ibuprofen, Motrin, Vioxx, etc) FOR 3 MONTHS AFTER SURGERY. These medications can adversely affect fusion. If you absolutely need to be on these medications sooner, please check with your surgeon first.   -?If you were on a baby aspirin prior to surgery, you may generally resume that 2 weeks after surgery.   -?If you were on a blood thinner (like Coumadin/ Lovenox/ Heparin products/ or Plavix), check with your surgeon as to when that may be resumed after surgery.   -?You may have been given prescriptions for several different pain medications, or your surgeon may have provided you with one type of pain medication. Take the Percocet (oxycodone/ acetaminophen) for severe pain; and Tylenol (acetaminophen) for mild pain. Try to take the appropriate medication for the level of pain you are having. Take the pain medicine only when you need to.   -?Pain medications are helpful around the time of surgery, but they can cause problems if taken for too long. The goal is to try to get you off of the medications by 4-6 weeks or earlier, if possible. Some people may need medications for longer than 4-6 weeks, and that's ok. But try to wean yourself off of them if you can.   -?You may take Colace to keep your bowels regular. The pain medicines may tend to make you somewhat constipated. Feel free to take any over the counter laxatives if you need to.     Diet   -?Eat whatever you like. You may not feel like eating too much for a few days, and that's ok. Foods high in fiber (fruits, vegetables) are good in that they can help reduce constipation.   -?Drink plenty of fluids.     Follow up   -?[facility] will call you next week to schedule your post-op appointment.    Questions   -?Feel free to call Dr. Abad's office with any questions. or if any issues arise.  The office phone number is [phone].  If it  is after hours, you will be directed to our answering service and your call will be returned.  • However, if you feel that you are having a true emergency, please go to the closest Emergency Room ([facility] ER would be preferred).

## 2021-11-04 NOTE — OR SURGEON
Pre-Procedure patient identification:  I am the primary operating surgeon/proceduralist and I have identified the patient on 11/04/21 at 7:29 AM Emilio Abad MD  Phone Number: [phone]

## 2021-11-04 NOTE — CONSULTS
Hospital Medicine Service -  Inpatient Consultation         Requesting Physician: Dr. Emilio Abad    Reason for Consultation: Post-op medical management     HISTORY OF PRESENT ILLNESS        This is a 71 y.o. female with past medical history of chronic kidney disease, GERD osteoarthritis, basal cell skin carcinoma, and chronic back pain with bilateral radiculopathy secondary to lumbar spinal stenosis & spondylolisthesis who presents to [facility] on 11/4/21 for elective L4-S1 anterior lumbar interbody fusion with posterior instrumentation and spinal fusion. Surgery was without incident. [facility] was consulted for post-op medical management. Patient drowsy but arousable. Currently no new concerns or complaints. Anterior surgical site and back pain controlled. Denies any cp, sob, abd pain, n/v.     PAST MEDICAL AND SURGICAL HISTORY        Past Medical History:   Diagnosis Date   • Basal cell carcinoma of skin    • Bronchitis    • Chronic kidney disease, stage II (mild)    • Constipation    • Genital herpes simplex    • Greater trochanteric bursitis of left hip    • Low back pain    • Menopause    • Motion sickness    • Osteoarthritis    • Spondylolisthesis    • Spondylolisthesis, lumbosacral region    • Urinary hesitancy        Past Surgical History:   Procedure Laterality Date   • COLONOSCOPY      multiple   • DILATION AND CURETTAGE OF UTERUS         PCP: Francine Flores MD    MEDICATIONS        Home Medications:  Medications Prior to Admission   Medication Sig Dispense Refill Last Dose   • bimatoprost (LATISSE) 0.03 % ophthalmic solution Administer 1 application into both eyes once daily.   11/3/2021 at Unknown time   • calcium carbonate-vitamin D3 500 mg(1,250mg) -400 unit chewable tablet Take 1 tablet by mouth once daily.   Past Week at Unknown time   • naproxen (NAPROSYN) 500 mg tablet Take 500 mg by mouth 2 (two) times a day with meals.   Past Month at Unknown time   • omeprazole (PriLOSEC) 20 mg  capsule Take by mouth once daily.  12 11/3/2021 at Unknown time   • polyethylene glycol 17 gram packet Take 17 g by mouth daily.   Past Week at Unknown time   • valACYclovir (VALTREX) 500 mg tablet Take by mouth as needed.     Past Month at Unknown time       Current inpatient medications were personally reviewed.    ALLERGIES        Scallops, Shellfish containing products, and Penicillins    FAMILY HISTORY        Family History   Problem Relation Age of Onset   • Congenital heart disease Biological Mother    • Diabetes Biological Father    • Stroke Biological Father        SOCIAL HISTORY        Social History     Socioeconomic History   • Marital status:      Spouse name: None   • Number of children: None   • Years of education: None   • Highest education level: None   Occupational History   • None   Tobacco Use   • Smoking status: Never Smoker   • Smokeless tobacco: Never Used   Vaping Use   • Vaping Use: Never used   Substance and Sexual Activity   • Alcohol use: Never     Comment: no alcohol for 38 yrs   • Drug use: Never   • Sexual activity: None   Other Topics Concern   • None   Social History Narrative   • None     Social Determinants of Health     Financial Resource Strain:    • Difficulty of Paying Living Expenses: Not on file   Food Insecurity:    • Worried About Running Out of Food in the Last Year: Not on file   • Ran Out of Food in the Last Year: Not on file   Transportation Needs:    • Lack of Transportation (Medical): Not on file   • Lack of Transportation (Non-Medical): Not on file   Physical Activity:    • Days of Exercise per Week: Not on file   • Minutes of Exercise per Session: Not on file   Stress:    • Feeling of Stress : Not on file   Social Connections:    • Frequency of Communication with Friends and Family: Not on file   • Frequency of Social Gatherings with Friends and Family: Not on file   • Attends Yarsanism Services: Not on file   • Active Member of Clubs or Organizations: Not on  "file   • Attends Club or Organization Meetings: Not on file   • Marital Status: Not on file   Intimate Partner Violence:    • Fear of Current or Ex-Partner: Not on file   • Emotionally Abused: Not on file   • Physically Abused: Not on file   • Sexually Abused: Not on file   Housing Stability:    • Unable to Pay for Housing in the Last Year: Not on file   • Number of Places Lived in the Last Year: Not on file   • Unstable Housing in the Last Year: Not on file       REVIEW OF SYSTEMS        All other systems reviewed and negative except as noted in HPI    PHYSICAL EXAMINATION        Visit Vitals  BP (!) 144/73 (BP Location: Left upper arm, Patient Position: Sitting)   Pulse 84   Temp 36.4 °C (97.5 °F) (Axillary)   Resp 18   Ht 1.753 m (5' 9\")   Wt 66.2 kg (146 lb)   SpO2 99%   Breastfeeding No   BMI 21.56 kg/m²     Body mass index is 21.56 kg/m².    Intake/Output Summary (Last 24 hours) at 11/4/2021 1652  Last data filed at 11/4/2021 1430  Gross per 24 hour   Intake 1500 ml   Output 420 ml   Net 1080 ml       Physical Exam  Vitals and nursing note reviewed.   Constitutional:       General: She is not in acute distress.     Appearance: She is well-developed. She is not diaphoretic.   HENT:      Head: Normocephalic and atraumatic.   Eyes:      General: No scleral icterus.        Right eye: No discharge.         Left eye: No discharge.      Conjunctiva/sclera: Conjunctivae normal.      Pupils: Pupils are equal, round, and reactive to light.   Cardiovascular:      Rate and Rhythm: Normal rate and regular rhythm.      Heart sounds: Normal heart sounds. No murmur heard.  No friction rub. No gallop.    Pulmonary:      Effort: Pulmonary effort is normal. No respiratory distress.      Breath sounds: Normal breath sounds. No stridor. No wheezing or rales.   Abdominal:      General: Bowel sounds are normal. There is no distension.      Palpations: Abdomen is soft. There is no mass.      Tenderness: There is no abdominal " tenderness. There is no guarding or rebound.      Comments: (+) incision site of mid abdomen appears c/d/i   Musculoskeletal:         General: No tenderness or deformity. Normal range of motion.      Cervical back: Normal range of motion and neck supple.   Skin:     General: Skin is warm and dry.      Coloration: Skin is not pale.      Findings: No erythema or rash.   Neurological:      Mental Status: She is alert and oriented to person, place, and time.   Psychiatric:         Behavior: Behavior normal.         Thought Content: Thought content normal.         Judgment: Judgment normal.         LABS / EKG        Labs  I have reviewed the patient's pertinent labs.  Significant abnormals are mildly elevated Cr of 1.2.    SARS-CoV-2 (COVID-19) (no units)   Date/Time Value   11/01/2021 1035 Negative        ECG/Telemetry  No recent EKGs    Imaging  Lumbar x-rays reviewed.     ASSESSMENT AND RECOMMENDATIONS           Spondylolisthesis of lumbar region  Assessment & Plan  See lumbar stenosis section     * Spinal stenosis, lumbar region, with neurogenic claudication  Assessment & Plan  H/o lumbar spinal stenosis & spondylolisthesis s/p elective L4-S1 anterior lumbar fusion with posterior instrumentation and fusion on 11/4.  Pain controlled.    Plan:  Currently being managed by orthopedics service.  Cont w/wound care, perioperative abx, pain management and DVT PPX per primary service  Sennokot, miralax daily for bowel regimen  PT/OT    GERD (gastroesophageal reflux disease)  Assessment & Plan  Cont w/PPI    Chronic kidney disease (CKD), stage III (moderate) (CMS/[facility])  Assessment & Plan  H/o CKD, baseline Cr ranges from 0.9-1.2  Cr from labs performed on 11/1 with Cr 1.2    Plan:  Monitor bmp for now            Ela Mazariegos, DO  11/4/2021

## 2021-11-04 NOTE — OR SURGEON
Pre-Procedure patient identification:  I am the primary operating surgeon/proceduralist and I have identified the patient on 11/04/21 at 7:49 AM Devyn Hill M.D., [phone]

## 2021-11-04 NOTE — ASSESSMENT & PLAN NOTE
Cont with pain control, PT/OT f/u  DVT proph  Mgmt as per Ortho.   Seems to have improved pain today in her back  Some pain in Right leg. ;

## 2021-11-04 NOTE — PLAN OF CARE
Problem: Adult Inpatient Plan of Care  Goal: Plan of Care Review  Outcome: Progressing  Flowsheets (Taken 11/4/2021 5891)  Progress: no change  Plan of Care Reviewed With: patient  Outcome Summary: PT evaluation completed. Supervision bed mobility, modA x1 for instability and balance impairments w/ sit<>stand and transfer to chair. Further evaluation limited by post-op lethargy, patient repeatedly falling asleep. Anticipate return home w/ family assist and home PT if needed. PT will continue to follow. AM-PAC 15/24.

## 2021-11-04 NOTE — ANESTHESIA PREPROCEDURE EVALUATION
Relevant Problems   MUSCULOSKELETAL   (+) Osteoarthritis       Anesthesia ROS/MED HX    Anesthesia History - neg  Pulmonary - neg  Neuro/Psych - neg  Cardiovascular- neg   Covid19 Test Reviewed and ECG reviewed   Normal ECG    Comments: Medical clearance noted.  Hematological - neg  GI/Hepatic- neg  Musculoskeletal- neg  Renal Disease- neg  Endo/Other- neg  Body Habitus: Normal       Past Surgical History:   Procedure Laterality Date   • COLONOSCOPY      multiple   • DILATION AND CURETTAGE OF UTERUS         Physical Exam    Airway   Mallampati: III   TM distance: >3 FB   Neck ROM: full  Cardiovascular - normal   Rhythm: regular   Rate: normalPulmonary - normal   clear to auscultation  Other Findings   Medical clearance noted.        Anesthesia Plan    Plan: general   ASA 2

## 2021-11-04 NOTE — OP NOTE
DATE OF OPERATION:  11/4/2021     PREOPERATIVE DIAGNOSIS:    1. L4-L5 spinal stenosis with neurogenic claudication.   2. L4-L5 degenerative spondylolisthesis.   3. Severe L5-S1 bilateral neuroforaminal stenosis.   4. L5-S1 spondylolisthesis     POSTOPERATIVE DIAGNOSIS:    1. L4-L5 spinal stenosis with neurogenic claudication.   2. L4-L5 degenerative spondylolisthesis.   3. Severe L5-S1 bilateral neuroforaminal stenosis.   4. L5-S1 spondylolisthesis     PROCEDURE PERFORMED:    1. Anterior lumbar interbody fusion, L5-S1. (54757)  2. Anterior lumbar interbody fusion, L4-L5. (64238)  3. Insertion of interbody device L5-S1. (20745)  4. Insertion of interbody device L4-L5. (61702)  5. Posterior spinal fusion L4-L5. (81577)  6. Posterior spinal fusion L5-S1. (92187)  7. Posterior spine segmental instrumentation from L4-S1. (53493)  8. Computer assisted surgery (computer navigation) for spinal surgery (81239)       SURGEON:  Emilio Abad MD       CO-SURGEON: Dr. Devyn Hill performed the anterior retroperitoneal approach to the L4-5 and L5-S1 levels.     ASSISTANT:  Marnie Dominguez PA-C.     ESTIMATED BLOOD LOSS:  100 mL.     ANESTHESIA:  General endotracheal anesthesia.     COMPLICATIONS:  None.     IMPLANTS:  We used the NuVasive Modulus implant at L4-5 and L5-S1.  Posteriorly, we used the NuVasive Reline pedicle screw system.     INDICATION FOR SURGERY:  Ms. Kitchen is a very pleasant 71-year-old  female who had a very significant and substantial back and bilateral lower extremity pain.  This was refractory to conservative treatment.  The patient was evaluated as an outpatient, was found to have an L4-5 degenerative spondylolisthesis as well as L5-S1 spondylolisthesis with severe neural foraminal stenosis bilaterally.  We discussed various treatment options including continued conservative treatment versus surgical intervention.  We felt that the best opportunity to obtain a solid fusion as well as maintain lordosis and  to optimize their risk/benefit profile would be to perform a two-level anterior lumbar interbody fusion with posterior percutaneous instrumentation and fusion.  The patient understood the risks and benefits.  Risks include, but were not limited to damage to visceral structures, damage to vascular structures, incomplete resolution of symptoms, failure of instrumentation, fracture, deep vein thrombosis, infection, need for future surgery.  The patient understood the risks and benefits and decided to move forward with surgery..     DESCRIPTION OF PROCEDURE:  The patient was seen in the preop holding area.  At that time, we again reviewed informed consent.  We marked their lumbar spine and came back to the operating room.  The patient was intubated with general endotracheal anesthesia and a Copeland was placed under sterile conditions.  The patient was then placed supine on an operating table with all bony prominences padded.  Their anterior abdomen was prepped and draped in the usual sterile fashion.  Dr. Devyn Hill then performed anterior retroperitoneal exposure first to the L4-5 disk space.  Once we arrived this space, we marked the disk with a spinal needle and confirmed this location on AP and lateral fluoroscopy.  We then performed a thorough diskectomy at L4-L5 with a Cecil curettes, knife, and Kerrisons.  Once this was done, we placed a trial implant followed by the final implant.  Of note, the final implant contained I-Factor bone graft.  I placed one screw into the L4 and L5 vertebral bodies.  After this was done, Dr. Devyn Hill then exposed the L5-S1 disk space.  At that time, we then performed a thorough diskectomy again with a knife, Cecil, curettes, pituitaries and Kerrison.  We placed a trial implant followed by the final implant.  Final implant again contained I-Factor.  We then secured 1 screw into the sacrum and one into the L5 vertebral body.  After this was done, this concluded the anterior lumbar  interbody fusion from L4-S1.  We took final x-rays and Dr. Devyn Hill performed a multilayer anterior abdominal closure.      The patient was then positioned prone on a Ori table with all bony prominences padded.  The posterior lumbar spine was prepped and draped in the usual sterile fashion.  We then placed a small incision over the L3 spinous process and placed a spinous process clamp.  This clamp served as an array for the O arm.  We then performed an O arm scan.  We then made bilateral Tammy incision from the L4-S1 pedicle starting points and placed pedicle screws using O arm navigation from L4-S1.  Once this was done, we then used a navigated christian to decorticate the facet joints in the lateral pars.  We used the remainder of the bone graft to constitute a posterior spinal fusion.  Once this was done, we then placed rods and set screws bilaterally and provisionally tightened them, performed another O arm scan to ensure that everything was in appropriate position and then final tightened all instrumentation.  Once this was done, we then performed a closure in a multilayered fashion.    Faviola JEREZ) served as a surgical assistant for all of the above eligible codes.    The assistant served to assist with patient positioning, surgical exposure and retraction of tissues in all phases of the surgery.  The assistant was instrumental in the appropriate positioning of the spinal implants and providing technical assistance with the spinal fusion.  The assistant assisted with closure of the surgical incision(s) and application of dressings.  Their assistance enhanced the efficiency of the case which resulted in a shorter surgical anesthetic and duration of surgery.  The shorter surgical duration promoted a shorter hospital stay, lower infection risk and decreased the risk of deep vein thrombosis.  The assistance also helped to provide a safer and more predictable surgical outcome.

## 2021-11-05 ENCOUNTER — APPOINTMENT (INPATIENT)
Dept: RADIOLOGY | Facility: HOSPITAL | Age: 71
DRG: 455 | End: 2021-11-05
Attending: PHYSICIAN ASSISTANT
Payer: MEDICARE

## 2021-11-05 PROBLEM — R33.9 URINARY RETENTION: Status: ACTIVE | Noted: 2021-11-05

## 2021-11-05 PROBLEM — I95.9 HYPOTENSION: Status: ACTIVE | Noted: 2021-11-05

## 2021-11-05 LAB
ANION GAP SERPL CALC-SCNC: 8 MEQ/L (ref 3–15)
BUN SERPL-MCNC: 16 MG/DL (ref 8–20)
CALCIUM SERPL-MCNC: 8.6 MG/DL (ref 8.9–10.3)
CHLORIDE SERPL-SCNC: 105 MEQ/L (ref 98–109)
CO2 SERPL-SCNC: 23 MEQ/L (ref 22–32)
CREAT SERPL-MCNC: 1 MG/DL (ref 0.6–1.1)
ERYTHROCYTE [DISTWIDTH] IN BLOOD BY AUTOMATED COUNT: 12.6 % (ref 11.7–14.4)
GFR SERPL CREATININE-BSD FRML MDRD: 54.3 ML/MIN/1.73M*2
GLUCOSE SERPL-MCNC: 130 MG/DL (ref 70–99)
HCT VFR BLDCO AUTO: 35.9 % (ref 35–45)
HCT VFR BLDCO AUTO: 36.8 % (ref 35–45)
HGB BLD-MCNC: 11.9 G/DL (ref 11.8–15.7)
HGB BLD-MCNC: 12.1 G/DL (ref 11.8–15.7)
MCH RBC QN AUTO: 32.9 PG (ref 28–33.2)
MCHC RBC AUTO-ENTMCNC: 33.1 G/DL (ref 32.2–35.5)
MCV RBC AUTO: 99.2 FL (ref 83–98)
PDW BLD AUTO: 9.6 FL (ref 9.4–12.3)
PLATELET # BLD AUTO: 182 K/UL (ref 150–369)
POTASSIUM SERPL-SCNC: 4.2 MEQ/L (ref 3.6–5.1)
RBC # BLD AUTO: 3.62 M/UL (ref 3.93–5.22)
SODIUM SERPL-SCNC: 136 MEQ/L (ref 136–144)
WBC # BLD AUTO: 15.62 K/UL (ref 3.8–10.5)

## 2021-11-05 PROCEDURE — 12000000 HC ROOM AND CARE MED/SURG

## 2021-11-05 PROCEDURE — 99232 SBSQ HOSP IP/OBS MODERATE 35: CPT | Performed by: INTERNAL MEDICINE

## 2021-11-05 PROCEDURE — 97535 SELF CARE MNGMENT TRAINING: CPT | Mod: GO

## 2021-11-05 PROCEDURE — 85027 COMPLETE CBC AUTOMATED: CPT | Performed by: PHYSICIAN ASSISTANT

## 2021-11-05 PROCEDURE — 72100 X-RAY EXAM L-S SPINE 2/3 VWS: CPT

## 2021-11-05 PROCEDURE — 25800000 HC PHARMACY IV SOLUTIONS: Performed by: PHYSICIAN ASSISTANT

## 2021-11-05 PROCEDURE — 63700000 HC SELF-ADMINISTRABLE DRUG: Performed by: PHYSICIAN ASSISTANT

## 2021-11-05 PROCEDURE — 80048 BASIC METABOLIC PNL TOTAL CA: CPT | Performed by: PHYSICIAN ASSISTANT

## 2021-11-05 PROCEDURE — 63600000 HC DRUGS/DETAIL CODE: Performed by: PHYSICIAN ASSISTANT

## 2021-11-05 PROCEDURE — 63700000 HC SELF-ADMINISTRABLE DRUG: Performed by: UROLOGY

## 2021-11-05 PROCEDURE — 36415 COLL VENOUS BLD VENIPUNCTURE: CPT | Performed by: PHYSICIAN ASSISTANT

## 2021-11-05 PROCEDURE — 85018 HEMOGLOBIN: CPT | Performed by: INTERNAL MEDICINE

## 2021-11-05 PROCEDURE — 97166 OT EVAL MOD COMPLEX 45 MIN: CPT | Mod: GO

## 2021-11-05 PROCEDURE — 25800000 HC PHARMACY IV SOLUTIONS: Performed by: NURSE PRACTITIONER

## 2021-11-05 RX ORDER — SODIUM CHLORIDE 9 MG/ML
INJECTION, SOLUTION INTRAVENOUS CONTINUOUS
Status: DISCONTINUED | OUTPATIENT
Start: 2021-11-05 | End: 2021-11-06

## 2021-11-05 RX ORDER — TAMSULOSIN HYDROCHLORIDE 0.4 MG/1
0.4 CAPSULE ORAL DAILY
Status: DISCONTINUED | OUTPATIENT
Start: 2021-11-05 | End: 2021-11-07 | Stop reason: HOSPADM

## 2021-11-05 RX ADMIN — DOCUSATE SODIUM AND SENNOSIDES 1 TABLET: 8.6; 5 TABLET, FILM COATED ORAL at 10:31

## 2021-11-05 RX ADMIN — PANTOPRAZOLE SODIUM 20 MG: 20 TABLET, DELAYED RELEASE ORAL at 10:32

## 2021-11-05 RX ADMIN — TAMSULOSIN HYDROCHLORIDE 0.4 MG: 0.4 CAPSULE ORAL at 16:39

## 2021-11-05 RX ADMIN — OXYCODONE HYDROCHLORIDE 10 MG: 5 TABLET ORAL at 13:24

## 2021-11-05 RX ADMIN — OXYCODONE HYDROCHLORIDE 10 MG: 5 TABLET ORAL at 05:21

## 2021-11-05 RX ADMIN — SODIUM CHLORIDE: 9 INJECTION, SOLUTION INTRAVENOUS at 08:30

## 2021-11-05 RX ADMIN — SODIUM CHLORIDE 500 ML: 9 INJECTION, SOLUTION INTRAVENOUS at 10:55

## 2021-11-05 RX ADMIN — SODIUM CHLORIDE 500 ML: 9 INJECTION, SOLUTION INTRAVENOUS at 15:11

## 2021-11-05 RX ADMIN — OXYCODONE HYDROCHLORIDE 10 MG: 5 TABLET ORAL at 20:56

## 2021-11-05 RX ADMIN — SODIUM CHLORIDE: 9 INJECTION, SOLUTION INTRAVENOUS at 20:46

## 2021-11-05 RX ADMIN — LATANOPROST 1 DROP: 50 SOLUTION OPHTHALMIC at 22:24

## 2021-11-05 RX ADMIN — SODIUM CHLORIDE: 9 INJECTION, SOLUTION INTRAVENOUS at 15:11

## 2021-11-05 RX ADMIN — DOCUSATE SODIUM AND SENNOSIDES 1 TABLET: 8.6; 5 TABLET, FILM COATED ORAL at 20:47

## 2021-11-05 RX ADMIN — ONDANSETRON HYDROCHLORIDE 4 MG: 2 SOLUTION INTRAMUSCULAR; INTRAVENOUS at 13:11

## 2021-11-05 RX ADMIN — POLYETHYLENE GLYCOL (3350) 17 G: 17 POWDER, FOR SOLUTION ORAL at 10:32

## 2021-11-05 ASSESSMENT — COGNITIVE AND FUNCTIONAL STATUS - GENERAL
DRESSING REGULAR LOWER BODY CLOTHING: 1 - TOTAL
HELP NEEDED FOR PERSONAL GROOMING: 3 - A LITTLE
TOILETING: 3 - A LITTLE
DRESSING REGULAR UPPER BODY CLOTHING: 3 - A LITTLE
HELP NEEDED FOR BATHING: 3 - A LITTLE
EATING MEALS: 4 - NONE
AFFECT: WFL

## 2021-11-05 NOTE — PROGRESS NOTES
S:  Expected post-op surgical pain.  Denies chest pain, shortness of breath.  No current nausea/vomiting.  She reports urinary retention which has been present since surgery.  She states a chronic pre-existing history of urinary hesitancy.    O:    Vitals:    11/04/21 2059 11/05/21 0000 11/05/21 0440 11/05/21 0800   BP: (!) 151/87 137/69 (!) 118/56 (!) 115/56   BP Location: Left upper arm Right upper arm Right upper arm Left upper arm   Patient Position: Lying Lying Lying Lying   Pulse: 92 72 69 85   Resp: 18 18 18 16   Temp: 36.2 °C (97.2 °F) 36.2 °C (97.2 °F) 36.1 °C (97 °F) 36.8 °C (98.2 °F)   TempSrc: Axillary Oral Axillary Oral   SpO2: 98% 99% 97% 98%   Weight:       Height:           Exam:   Incision: C/D/I  Pulses palp all extremities, brisk cap refill  Calves nontender bilaterally    Neuro:   Lower Extremity Motor Function    Right  Left    Iliopsoas  5/5  5/5    Quadriceps 5/5 5/5   Tibialis anterior  4/5 (baseline) 5/5    EHL  3/5 (baseline) 5/5    Gastroc. muscle  5/5  5/5                  A/P:   POD # 1 s/p L4-S1 ALIF  -Medically stable; hospitalist service on consult  -OOB as tolerated with PT/OT and nursing  -Pain Management with PO oxycodone and flexeril  -DVT Prophylaxis with SCDs  -Dispo pending PT/OT and pain control

## 2021-11-05 NOTE — PROGRESS NOTES
Patient:  Windy Kitchen  Location:  [place] [address]  MRN:  [id number]  Today's date:  11/5/2021    Ebonie is a 71 y.o. female admitted on 11/4/2021 with Spondylolisthesis of lumbar region [M43.16]  Spinal stenosis, lumbar region, with neurogenic claudication [M48.062]. Principal problem is Spinal stenosis, lumbar region, with neurogenic claudication.    Past Medical History  Ebonie has a past medical history of Basal cell carcinoma of skin, Bronchitis, Chronic kidney disease, stage II (mild), Constipation, Genital herpes simplex, Greater trochanteric bursitis of left hip, Low back pain, Menopause, Motion sickness, Osteoarthritis, Spondylolisthesis, Spondylolisthesis, lumbosacral region, and Urinary hesitancy.    History of Present Illness   71-year-old female w/ PMH as listed above presents POD #0 s/p elective L4-S1 anterior lumbar interbody fusion with posterior instrumentation and spinal fusion secondary to lumbar spondylolisthesis, symptoms have been refractory to conservative measures, including oral medications, physical therapy, and epidural injections.      OT Vitals    Date/Time Pulse SpO2 BP BP Location BP Method Pt Position [facility]   11/05/21 1331 71 94 % 146/70 Left upper arm Automatic Lying [facility]   11/05/21 1335 -- -- 122/58 Left upper arm Automatic Sitting [facility]   11/05/21 1340 -- -- 114/62 Left upper arm Automatic Standing [facility]   11/05/21 1345 -- -- 89/63 Left upper arm Automatic Standing [facility]   11/05/21 1350 -- -- 135/61 Left upper arm Automatic Lying [facility]      OT Pain    Date/Time Pain Type Location Rating: Rest Interventions [facility]   11/05/21 1331 Pain Assessment back 8 position adjusted [facility]   11/05/21 1350 Post Activity back 8 position adjusted [facility]          Prior Living Environment      Most Recent Value   People in Home spouse   Current Living Arrangements home   Living Environment Comment lives w/  (works from home) in senior living community. first floor set-up w/ walk-in shower, shower chair,  full flight of stairs to finished basement.        Prior Level of Function      Most Recent Value   Dominant Hand right   Ambulation independent   Transferring independent   Toileting independent   Bathing assistive equipment   Dressing independent   Eating independent   Communication understands/communicates without difficulty   Prior Level of Function Comment Patient is retired , 2 dogs at home, rides horses in her spare time. drives. independent w/ ADLs. ambulation limited to approximately 1/4 mile per  due to radicular pain.   Assistive Device Currently Used at Home shower chair        Occupational Profile      Most Recent Value   Reason for Services/Referral s/p L4-S1 fusion           OT Evaluation and Treatment - 11/05/21 1328        OT Time Calculation    Start Time 1328     Stop Time 1401     Time Calculation (min) 33 min        Session Details    Document Type initial evaluation     Mode of Treatment occupational therapy        General Information    Patient Profile Reviewed yes     Onset of Illness/Injury or Date of Surgery 11/04/21     Referring Physician Abad     Patient/Family/Caregiver Comments/Observations RN cleared for OT. Pt's spouse present for session     General Observations of Patient Patient supine, awake, agreeable to therapy     Existing Precautions/Restrictions fall;spinal        Cognition/Psychosocial    Affect/Mental Status (Cognition) WFL     Orientation Status (Cognition) oriented x 4     Follows Commands (Cognition) WFL     Cognitive Function WFL     Comment, Cognition Pleasant and cooperative. Receptive to education        Hearing Assessment    Hearing Status WFL        Vision Assessment/Intervention    Visual Impairment/Limitations corrective lenses for reading        Sensory Assessment (Somatosensory)    Sensory Assessment (Somatosensory) UE sensation intact        Range of Motion (ROM)    Range of Motion bilateral upper extremities;ROM is WFL        Strength  (Manual Muscle Testing)    Strength (Manual Muscle Testing) bilateral upper extremities;strength is WFL        Bed Mobility    [place], Supine to Sit minimum assist (75% or more patient effort);1 person assist;verbal cues     Verbal Cues (Supine to Sit) hand placement;safety;technique     [place], Sit to Supine minimum assist (75% or more patient effort);1 person assist;verbal cues     Verbal Cues (Sit to Supine) technique     Assistive Device bed rails;head of bed elevated        Transfers    Transfers toilet transfer        Sit to Stand Transfer    [place], Sit to Stand Transfer minimum assist (75% or more patient effort);1 person assist;verbal cues     Verbal Cues hand placement;safety;technique     Assistive Device walker, front-wheeled     Comment from EOB, from chair        Stand to Sit Transfer    [place], Stand to Sit Transfer minimum assist (75% or more patient effort);1 person assist;verbal cues     Verbal Cues hand placement;safety;technique     Assistive Device walker, front-wheeled     Comment to chair, to EOB        Toilet Transfer    Transfer Technique stand-sit;sit-stand     [place], Toilet Transfer minimum assist (75% or more patient effort);1 person assist;verbal cues     Verbal Cues hand placement;safety;technique     Assistive Device grab bars/safety frame;walker, front-wheeled        Safety Issues, Functional Mobility    Impairments Affecting Function (Mobility) pain;balance;endurance/activity tolerance;strength        Balance    Balance Assessment sitting static balance;sit to stand dynamic balance;sitting dynamic balance;standing static balance;standing dynamic balance     Static Sitting Balance WFL     Dynamic Sitting Balance mild impairment;sitting, edge of bed     Sit to Stand Dynamic Balance mild impairment     Static Standing Balance mild impairment;supported     Dynamic Standing Balance mild impairment;supported     Comment, Balance RW        Lower Body Dressing     Tasks socks     [place] dependent (less than 25% patient effort)     Comment difficulty with figure 4 sit, reports spouse will assist as needed at home at d/c        Grooming    Self-Performance washes, rinses and dries hands     [place] modified independence     Position supported sitting     Setup Assistance obtain supplies        Toileting    [place] minimum assist (75% or more patient effort);adjust/manage clothing     Position unsupported sitting     Adaptive Equipment grab bar/safety frame        BADL Safety/Performance    Impairments, BADL Safety/Performance pain;balance;endurance/activity tolerance;strength        AM-PAC (TM) - ADL (Current Function)    Putting on and taking off regular lower body clothing? 1 - Total     Bathing? 3 - A Little     Toileting? 3 - A Little     Putting on/taking off regular upper body clothing? 3 - A Little     How much help for taking care of personal grooming? 3 - A Little     Eating meals? 4 - None     AM-PAC (TM) ADL Score 17        Assessment/Plan (OT)    Daily Outcome Statement OT evaluation completed for 70 yo s/p L4-S1 fusion. Patient limited by pain as well as drop in BP with activity. Min A for transfers and mobility with RW, MIn A toileting, Dep LB dressing. Patient will benefit from continued OT services to maximize functional independence. Anticipate with improved pain control and medical stability, patient will be able to d/c home with assist.     Rehab Potential good, to achieve stated therapy goals     Therapy Frequency 5 times/wk     Planned Therapy Interventions BADL retraining;functional balance retraining;occupation/activity based interventions;patient/caregiver education/training;transfer/mobility retraining               OT Assessment/Plan      Most Recent Value   OT Recommended Discharge Disposition home with assistance at 11/05/2021 1328   Anticipated Equipment Needs At Discharge (OT) walker, front-wheeled, commode, 3-in-1, bathing  equipment at 11/05/2021 1328   Patient/Family Therapy Goal Statement d/c home at 11/05/2021 1328                    Education Documentation  Activity, taught by Rosa Red OT at 11/5/2021  2:24 PM.  Learner: Patient  Readiness: Acceptance  Method: Explanation  Response: Verbalizes Understanding  Comment: Role of OT, POC, spinal precautions, bed mobility, LB dressing techniques, functional transfers, safety with RW, call bell use, falls prevention          OT Goals      Most Recent Value   Bed Mobility Goal 1    Activity/Assistive Device bed mobility activities, all at 11/05/2021 1328   [place] modified independence at 11/05/2021 1328   Time Frame by discharge at 11/05/2021 1328   Progress/Outcome goal ongoing at 11/05/2021 1328   Transfer Goal 1    Activity/Assistive Device sit-to-stand/stand-to-sit, bed-to-chair/chair-to-bed, toilet, shower at 11/05/2021 1328   [place] modified independence at 11/05/2021 1328   Time Frame by discharge at 11/05/2021 1328   Progress/Outcome goal ongoing at 11/05/2021 1328   Dressing Goal 1    Activity/Adaptive Equipment dressing skills, all at 11/05/2021 1328   [place] modified independence at 11/05/2021 1328   Time Frame by discharge at 11/05/2021 1328   Progress/Outcome goal ongoing at 11/05/2021 1328   Toileting Goal 1    Activity/Assistive Device toileting skills, all at 11/05/2021 1328   [place] modified independence at 11/05/2021 1328   Time Frame by discharge at 11/05/2021 1328   Progress/Outcome goal ongoing at 11/05/2021 1328

## 2021-11-05 NOTE — PROGRESS NOTES
Patient: Windy Kitchen  Location: [place] [address]  MRN: [id number]  Today's date: 11/5/2021    Attempted to see patient for therapy. Unable due to medical hold.       Daily Outcome Statement: Multiple attempts to see the patient today. AM low BP, later AM x-ray, PM low BP and symptomatic with OT. Will follow up tomorrow when pressures are stable.

## 2021-11-05 NOTE — PATIENT CARE CONFERENCE
Care Progression Rounds Note  Date: 11/5/2021  Time: 8:27 AM     Patient Name: Windy Kitchen     Medical Record Number: [id number]   YOB: 1950  Sex: Female      Room/Bed: 0406    Admitting Diagnosis: Spondylolisthesis of lumbar region [M43.16]  Spinal stenosis, lumbar region, with neurogenic claudication [M48.062]   Admit Date/Time: 11/4/2021  5:58 AM    Primary Diagnosis: Spinal stenosis, lumbar region, with neurogenic claudication  Principal Problem: Spinal stenosis, lumbar region, with neurogenic claudication    GMLOS: pending  Anticipated Discharge Date: 11/6/2021    AM-PAC:  Mobility Score: 15    Discharge Planning:  Current Living Arrangements: home    Barriers to Discharge:  Barriers to Discharge: Medical issues not resolved,Other (see comments)  Comment: dizzy when up, PT OT CXR voiding trial d/t retention    Participants:  nursing,physician,physical therapy,,occupational therapy

## 2021-11-05 NOTE — ASSESSMENT & PLAN NOTE
Patient with episode of hypotension while standing, now improved with transition back to bed and IVFs. Remained asymptomatic  Episode likely multifactorial - possibly secondary to pain medication, prolonged time lying in bed, residual effects from anesthesia.No papo si/sxs of bleeding    Plan:  Repeat hgb for completeness  Cont w/IVFs  Encourage OOB/sitting upright position  Monitor for now

## 2021-11-05 NOTE — ASSESSMENT & PLAN NOTE
70 yo F, status post lower lumbar surgery for spinal stenosis, now with urinary retention/incomplete emptying.  This has improved.    Rec:  Cont Flomax x 2 weeks  Ok for d/c from  standpoint

## 2021-11-05 NOTE — PROGRESS NOTES
Hospital Medicine Service -  Daily Progress Note       SUBJECTIVE   Interval History: No acute events overnight. Patient with urinary retention this morning for which Urology was consulted. Patient also hypotensive with standing, now improved with transition back to bed and IVFs. Patient seen and examined. Complains of persistent back pain but fairly well controlled with current pain regimen. Denies any lightheadedness, dizziness, cp, sob, heart palpitations, n/v, abd pain. (+) flatus, no BM     OBJECTIVE      Vital signs in last 24 hours:  Temp:  [36.1 °C (97 °F)-36.8 °C (98.3 °F)] 36.8 °C (98.3 °F)  Heart Rate:  [69-92] 80  Resp:  [16-18] 16  BP: ()/(56-87) 122/68    Intake/Output Summary (Last 24 hours) at 11/5/2021 1705  Last data filed at 11/5/2021 1538  Gross per 24 hour   Intake 940 ml   Output 1800 ml   Net -860 ml       PHYSICAL EXAMINATION      GEN: well-developed and well-nourished; not in acute distress  HEENT: normocephalic; atraumatic  NECK: no JVD; no bruits  CARDIO: regular rate and rhythm; no murmurs, rubs or gallops  RESP: clear to auscultation bilaterally; no rales, rhonchi, or wheezes  ABD: soft, non-distended, mild TTP around incision site, otherwise not significant tenderness to palpation, normal bowel sounds, abdominal incision c/d/i  EXT: no cyanosis, clubbing, or edema  SKIN: clean, dry, warm, and intact  MUSCULOSKELETAL: no injury or deformity  NEURO: alert and oriented x 3; nonfocal  BEHAVIOR/EMOTIONAL: appropriate; cooperative     LABS / IMAGING / TELE      Labs  Lab Results   Component Value Date    GLUCOSE 130 (H) 11/05/2021    CALCIUM 8.6 (L) 11/05/2021     11/05/2021    K 4.2 11/05/2021    CO2 23 11/05/2021     11/05/2021    BUN 16 11/05/2021    CREATININE 1.0 11/05/2021     Lab Results   Component Value Date    WBC 15.62 (H) 11/05/2021    HGB 12.1 11/05/2021    HCT 36.8 11/05/2021    MCV 99.2 (H) 11/05/2021     11/05/2021     Lab Results   Component Value  Date    ALBUMIN 4.6 04/01/2019    BILITOT 0.5 04/01/2019    ALKPHOS 76 04/01/2019    AST 20 04/01/2019    ALT 18 04/01/2019    PROTEIN 7.0 04/01/2019       Imaging  X-RAY LUMBAR SPINE 2 OR 3 VIEWS    Result Date: 11/5/2021  IMPRESSION: 1. Stable postoperative appearance L4-S1. 2. Grade 1 subluxations as described, with no definite change since 9/13/2021. COMMENT:  AP and lateral views of the lumbar spine are compared with intraoperative images from 11/4/2021, as well as scoliosis series from 9/13/2021.. L4-S1 posterior fusion hardware is stable, including intervertebral disc spacers. There is grade 1 anterolisthesis of L4 on L5,, including grade 1 retrolisthesis of L1 on L2 and L2 on L3, with no definite change from prior. Old T12 compression fracture is present. There is advanced L1-2 degenerative disease and spondylosis at the apex of the scoliosis    X-RAY LUMBAR SPINE 2 OR 3 VIEWS    Result Date: 11/4/2021  IMPRESSION:  Fluoroscopic visualization provided to Dr. Abad. COMMENT: 7 portable intraoperative supine frontal and lateral fluoroscopic images of the lumbosacral spine are correlated with a lumbar MRI dated 9/13/2021.  The first lateral images show an anterior metallic probe pointing at the L5-S1 and L4-5 interspace. Subsequent images show anterior placement of metallic interbody spacers anchored by endplate screws at L4-5 and L5-S1. The alignment is anatomic. The visualized vertebral bodies are normal in height but show marked discogenic endplate sclerosis and marginal spurring at L5-S1. The visualized intervertebral disc spaces show mild to moderate loss of height at L4-5 eccentric dorsally and moderate to severe loss at L5-S1 diffusely.. Total fluoroscopic time was 38.3 seconds with a cubital dose of 17.55 mGy.     X-RAY ABDOMEN 1 VIEW    Result Date: 11/4/2021  IMPRESSION: No unexpected foreign body or acute abdominopelvic abnormality. COMMENT: A single portable intraoperative AP supine radiograph of  the abdomen shows metallic interbody spacers at L4-5 and L5-S1 but no other radiopaque foreign body. Nondilated small bowel contains scattered pockets of gas. Nondilated large bowel contains stool and gas. No intraperitoneal free air is identified. There is no mass or mass-effect. No abnormal calcification is seen. The visualized axial skeleton is otherwise remarkable for a dextroconvex upper lumbar scoliosis and multilevel spondylosis.     FL FLUOROSCOPY TECHNICAL ASSISTANCE    Result Date: 11/4/2021  IMPRESSION:  Fluoroscopic visualization provided to Dr. Abad. COMMENT: 7 portable intraoperative supine frontal and lateral fluoroscopic images of the lumbosacral spine are correlated with a lumbar MRI dated 9/13/2021.  The first lateral images show an anterior metallic probe pointing at the L5-S1 and L4-5 interspace. Subsequent images show anterior placement of metallic interbody spacers anchored by endplate screws at L4-5 and L5-S1. The alignment is anatomic. The visualized vertebral bodies are normal in height but show marked discogenic endplate sclerosis and marginal spurring at L5-S1. The visualized intervertebral disc spaces show mild to moderate loss of height at L4-5 eccentric dorsally and moderate to severe loss at L5-S1 diffusely.. Total fluoroscopic time was 38.3 seconds with a cubital dose of 17.55 mGy.         ASSESSMENT AND PLAN      Spondylolisthesis of lumbar region  Assessment & Plan  See lumbar stenosis section     * Spinal stenosis, lumbar region, with neurogenic claudication  Assessment & Plan  H/o lumbar spinal stenosis & spondylolisthesis s/p elective L4-S1 anterior lumbar fusion with posterior instrumentation and fusion on 11/4.  Pain controlled.      Plan:  Currently being managed by orthopedics service.  Cont w/wound care, perioperative abx, pain management and DVT PPX per primary service  Sennokot, miralax daily for bowel regimen  PT/OT    Hypotension  Assessment & Plan  Patient with  episode of hypotension while standing, now improved with transition back to bed and IVFs. Remained asymptomatic  Episode likely multifactorial - possibly secondary to pain medication, prolonged time lying in bed, residual effects from anesthesia.No papo si/sxs of bleeding    Plan:  Repeat hgb for completeness  Cont w/IVFs  Encourage OOB/sitting upright position  Monitor for now    Urinary retention  Assessment & Plan  Patient admits to history of chronic urinary retention, although has not required intervention or straight cath   Suspect current issue likely exacerbated by recent surgery, narcotics  UA unremarkable for infection    Plan:  Urology already consulted. Per recs patient to undergo monitoring of postvoid residuals and straight catheter PRN  Cont Flomax  Cont to f/u recs    GERD (gastroesophageal reflux disease)  Assessment & Plan  Cont w/PPI    Chronic kidney disease (CKD), stage III (moderate) (CMS/[facility])  Assessment & Plan  H/o CKD, baseline Cr ranges from 0.9-1.2  Cr from labs performed on 11/1 with Cr 1.2    Plan:  Monitor bmp for now         VTE Assessment: [place]      Estimated discharge date: 11/6/2021  Code Status: Full Code     Ela Mazariegos, DO  11/5/2021

## 2021-11-05 NOTE — NURSING NOTE
Patient was able to void 150cc in commode before xray, PVR was 636. Patient refusing straight cath at this time, wants to try to void on own. Ortho aware of urinary retention, ordered a urology consult.

## 2021-11-05 NOTE — CONSULTS
Quiana Kitchen is a 71 y.o. female who was admitted for Spondylolisthesis of lumbar region [M43.16]  Spinal stenosis, lumbar region, with neurogenic claudication [M48.062]. Patient was referred by Deysi Ignacio for Management recommendations. Patient is A very pleasant 71-year-old female status post fusion for lumbar stenosis.  Patient reports some lower extremity weakness, which has been gradually improving.  Unfortunately, her postoperative course was uncomplicated by urinary retention.  Patient reports a greater than 20 year history of urinary hesitancy, but she reports she is always been able to empty her bladder to her knowledge.  He reports a sensation on her bladder is not empty, and on bladder scan has been found to have elevated postvoid residuals.  He has required intermittent catheterization overnight    .    Medical History:   Past Medical History:   Diagnosis Date   • Basal cell carcinoma of skin    • Bronchitis    • Chronic kidney disease, stage II (mild)    • Constipation    • Genital herpes simplex    • Greater trochanteric bursitis of left hip    • Low back pain    • Menopause    • Motion sickness    • Osteoarthritis    • Spondylolisthesis    • Spondylolisthesis, lumbosacral region    • Urinary hesitancy        Surgical History:   Past Surgical History:   Procedure Laterality Date   • COLONOSCOPY      multiple   • DILATION AND CURETTAGE OF UTERUS         Social History:   Social History     Social History Narrative   • Not on file       Family History:   Family History   Problem Relation Age of Onset   • Congenital heart disease Biological Mother    • Diabetes Biological Father    • Stroke Biological Father        Allergies: Scallops, Shellfish containing products, and Penicillins    Current Facility-Administered Medications   Medication Dose Route Frequency Provider Last Rate Last Admin   • cyclobenzaprine (FLEXERIL) tablet 10 mg  10 mg oral 3x daily PRN Faviola Dominguez PA C        • glucose chewable tablet 16-32 g of dextrose  16-32 g of dextrose oral PRN Faviola Dominguez PA C        Or   • dextrose 40 % oral gel 15-30 g of dextrose  15-30 g of dextrose oral PRN Faviola Dominguez PA C        Or   • glucagon (GLUCAGEN) injection 1 mg  1 mg intramuscular PRN Faviola Dominguez PA C        Or   • dextrose in water injection 12.5 g  25 mL intravenous PRN Faviola Dominguez PA C       • glucose chewable tablet 16-32 g of dextrose  16-32 g of dextrose oral PRN Faviola Dominguez PA C        Or   • dextrose 40 % oral gel 15-30 g of dextrose  15-30 g of dextrose oral PRN Faviola Dominguez PA C        Or   • glucagon (GLUCAGEN) injection 1 mg  1 mg intramuscular PRN Faviola Dominguez PA C        Or   • dextrose in water injection 12.5 g  25 mL intravenous PRN Faviola Dominguez PA C       • HYDROmorphone (DILAUDID) injection 0.4 mg  0.4 mg intravenous q2h PRN Faviola Dominguez PA C       • latanoprost (XALATAN) 0.005 % ophthalmic solution 1 drop  1 drop Both Eyes Nightly Ela Mazariegos, DO   1 drop at 11/04/21 2143   • ondansetron ODT (ZOFRAN-ODT) disintegrating tablet 4 mg  4 mg oral q8h PRN Faviola Dominguez PA C        Or   • ondansetron (ZOFRAN) injection 4 mg  4 mg intravenous q8h PRN Faviola Dominguez PA C   4 mg at 11/05/21 1311   • oxyCODONE (ROXICODONE) immediate release tablet 10 mg  10 mg oral q4h PRN Faviola Dominguez PA C   10 mg at 11/05/21 1324   • oxyCODONE (ROXICODONE) immediate release tablet 5 mg  5 mg oral q4h PRN Faviola Dominguez PA C       • pantoprazole (PROTONIX) tablet,delayed release (DR/EC) 20 mg  20 mg oral Daily Faviola Dominguez PA C   20 mg at 11/05/21 1032   • polyethylene glycol (MIRALAX) 17 gram packet 17 g  17 g oral Daily Faviola Dominguez PA C   17 g at 11/05/21 1032   • sennosides-docusate sodium (SENOKOT-S) 8.6-50 mg per tablet 1 tablet  1 tablet oral BID Faviola Dominguez PA C   1 tablet at 11/05/21 1031   •  "sodium chloride 0.9 % infusion   intravenous Continuous Faviola Dominguez PA C 80 mL/hr at 11/05/21 0830 New Bag at 11/05/21 0830        Medication List      ASK your doctor about these medications    bimatoprost 0.03 % ophthalmic solution  Commonly known as: LATISSE  Administer 1 application into both eyes once daily.  Dose: 1 application     calcium carbonate-vitamin D3 500 mg(1,250mg) -400 unit chewable tablet  Take 1 tablet by mouth once daily.  Dose: 1 tablet     naproxen 500 mg tablet  Commonly known as: NAPROSYN  Take 500 mg by mouth 2 (two) times a day with meals.  Dose: 500 mg     omeprazole 20 mg capsule  Commonly known as: PriLOSEC  Take by mouth once daily.     polyethylene glycol 17 gram packet  Commonly known as: MIRALAX  Take 17 g by mouth daily.  Dose: 17 g     valACYclovir 500 mg tablet  Commonly known as: VALTREX  Take by mouth as needed.          Review of Systems  All other systems reviewed and negative except as noted in the HPI.    Objective   Labs  Reviewed    Imaging        Physicial Exam  Visit Vitals  BP (!) 115/56 (BP Location: Left upper arm, Patient Position: Lying)   Pulse 85   Temp 36.8 °C (98.2 °F) (Oral)   Resp 16   Ht 1.753 m (5' 9\")   Wt 66.2 kg (146 lb)   SpO2 98%   Breastfeeding No   BMI 21.56 kg/m²       General Appearance:    Alert, cooperative, no distress, appears stated age   Head:    Normocephalic, without obvious abnormality, atraumatic   Eyes:    PERRL, conjunctiva/corneas clear, EOM's intact, fundi     benign, both eyes   Ears:    Normal TM's and external ear canals, both ears   Nose:   Nares normal, septum midline, mucosa normal, no drainage     or     sinus tenderness   Throat:   Lips, mucosa, and tongue normal; teeth and gums normal   Neck:   Supple, symmetrical, trachea midline, no adenopathy;     thyroid:  no enlargement/tenderness/nodules; no carotid    bruit or JVD   Back:     Symmetric, no curvature, ROM normal, no CVA tenderness   Lungs:     Clear to " auscultation bilaterally, respirations unlabored   Chest Wall:    No tenderness or deformity   Heart:    Regular rate and rhythm, S1 and S2 normal, no murmur, rub or          gallop   Breast Exam:    No tenderness, masses, or nipple abnormality   Abdomen:     Soft, non-tender, bowel sounds active all four quadrants,     no masses, no organomegaly   Genitalia:    Normal female without lesion, discharge or tenderness   Rectal:    Normal tone, no masses or tenderness; guaiac negative stool   Extremities:   Extremities normal, atraumatic, no cyanosis or edema   Musculoskeletal:  Pulses:   No injury or deformity    2+ and symmetric all extremities   Skin:   Skin color, texture, turgor normal, no rashes or lesions   Lymph nodes:   Cervical, supraclavicular, and axillary nodes normal   Neurologic:    Behavior/  Emotional:   CNII-XII intact, normal strength, sensation and reflexes     throughout    Appropriate, cooperative           Assessment   71 y.o. female being consulted for management recommendations       Plan     71-year-old , status post lower lumbar surgery for spinal stenosis, now with urinary retention.    Patient reports urge to void, which is encouraging, As this suggestsAcute urinary retention rather than chronic condition  Monitor postvoid residuals and straight catheter  Add Flomax  If discharge him in it, recommend placement of indwelling Cop[place] catheter for bladder rest with outpatient voiding trial  We'll follow

## 2021-11-05 NOTE — PLAN OF CARE
Problem: Adult Inpatient Plan of Care  Goal: Plan of Care Review  Outcome: Progressing  Flowsheets (Taken 11/5/2021 2230)  Plan of Care Reviewed With: patient  Outcome Summary: OT evaluation completed.

## 2021-11-05 NOTE — PLAN OF CARE
Problem: Adult Inpatient Plan of Care  Goal: Readiness for Transition of Care  Intervention: Mutually Develop Transition Plan  Flowsheets (Taken 11/5/2021 9019)  Anticipated Discharge Disposition: home without assistance or services  Equipment Needed After Discharge: none  Assistive Device/Animal Currently Used at Home: shower chair  Anticipated Changes Related to Illness: none  Concerns Comments:   POD # 1 s/p L4-S1 FLORES   retired    2 dogs at home   rides horses in her spare time   drives   independent w/ ADLs   Ambulation limited to back pain prior to surgery   reporting post surgical urinary retention   Flomax was prescribed by the urologist  Transportation Concerns: car, none  Readmission Within the Last 30 Days: no previous admission in last 30 days  Patient/Family Anticipated Services at Transition: none  Patient/Family Anticipates Transition to: home  Transportation Anticipated: family or friend will provide  Concerns to be Addressed:   discharge planning   adjustment to diagnosis/illness   medication  Patient's Choice of Community Agency(s): [facility] in [place]  Offered/Gave Vendor List: no    performed a chart review prior to meeting the patient and her .  Patient reports no home care needs.  PLAN: HOME.  Isrrael Juarez RN

## 2021-11-05 NOTE — ASSESSMENT & PLAN NOTE
Improving, less PVR on bladder scans  Cont with hydration orally, flomax,   Monitor PVR's, Urology f/u

## 2021-11-06 PROBLEM — I95.9 HYPOTENSION: Status: RESOLVED | Noted: 2021-11-05 | Resolved: 2021-11-06

## 2021-11-06 LAB
ANION GAP SERPL CALC-SCNC: 10 MEQ/L (ref 3–15)
BUN SERPL-MCNC: 15 MG/DL (ref 8–20)
CALCIUM SERPL-MCNC: 8.5 MG/DL (ref 8.9–10.3)
CHLORIDE SERPL-SCNC: 104 MEQ/L (ref 98–109)
CO2 SERPL-SCNC: 22 MEQ/L (ref 22–32)
CREAT SERPL-MCNC: 0.9 MG/DL (ref 0.6–1.1)
ERYTHROCYTE [DISTWIDTH] IN BLOOD BY AUTOMATED COUNT: 12.6 % (ref 11.7–14.4)
GFR SERPL CREATININE-BSD FRML MDRD: >60 ML/MIN/1.73M*2
GLUCOSE SERPL-MCNC: 128 MG/DL (ref 70–99)
HCT VFR BLDCO AUTO: 35.8 % (ref 35–45)
HGB BLD-MCNC: 11.9 G/DL (ref 11.8–15.7)
MCH RBC QN AUTO: 33 PG (ref 28–33.2)
MCHC RBC AUTO-ENTMCNC: 33.2 G/DL (ref 32.2–35.5)
MCV RBC AUTO: 99.2 FL (ref 83–98)
PDW BLD AUTO: 9.4 FL (ref 9.4–12.3)
PLATELET # BLD AUTO: 163 K/UL (ref 150–369)
POTASSIUM SERPL-SCNC: 4.1 MEQ/L (ref 3.6–5.1)
RBC # BLD AUTO: 3.61 M/UL (ref 3.93–5.22)
SODIUM SERPL-SCNC: 136 MEQ/L (ref 136–144)
WBC # BLD AUTO: 12.97 K/UL (ref 3.8–10.5)

## 2021-11-06 PROCEDURE — 97535 SELF CARE MNGMENT TRAINING: CPT | Mod: GO

## 2021-11-06 PROCEDURE — 1123F ACP DISCUSS/DSCN MKR DOCD: CPT | Performed by: INTERNAL MEDICINE

## 2021-11-06 PROCEDURE — 85027 COMPLETE CBC AUTOMATED: CPT | Performed by: INTERNAL MEDICINE

## 2021-11-06 PROCEDURE — 36415 COLL VENOUS BLD VENIPUNCTURE: CPT | Performed by: INTERNAL MEDICINE

## 2021-11-06 PROCEDURE — 97530 THERAPEUTIC ACTIVITIES: CPT | Mod: GP

## 2021-11-06 PROCEDURE — 200200 PR NO CHARGE: Performed by: INTERNAL MEDICINE

## 2021-11-06 PROCEDURE — 63700000 HC SELF-ADMINISTRABLE DRUG: Performed by: PHYSICIAN ASSISTANT

## 2021-11-06 PROCEDURE — 97116 GAIT TRAINING THERAPY: CPT | Mod: GP

## 2021-11-06 PROCEDURE — 12000000 HC ROOM AND CARE MED/SURG

## 2021-11-06 PROCEDURE — 80048 BASIC METABOLIC PNL TOTAL CA: CPT | Performed by: INTERNAL MEDICINE

## 2021-11-06 PROCEDURE — 63700000 HC SELF-ADMINISTRABLE DRUG: Performed by: UROLOGY

## 2021-11-06 PROCEDURE — 99232 SBSQ HOSP IP/OBS MODERATE 35: CPT | Performed by: INTERNAL MEDICINE

## 2021-11-06 PROCEDURE — 63600000 HC DRUGS/DETAIL CODE: Performed by: ORTHOPAEDIC SURGERY

## 2021-11-06 RX ORDER — DEXAMETHASONE SODIUM PHOSPHATE 4 MG/ML
10 INJECTION, SOLUTION INTRA-ARTICULAR; INTRALESIONAL; INTRAMUSCULAR; INTRAVENOUS; SOFT TISSUE
Status: COMPLETED | OUTPATIENT
Start: 2021-11-06 | End: 2021-11-07

## 2021-11-06 RX ORDER — OXYCODONE HYDROCHLORIDE 5 MG/1
5-10 TABLET ORAL EVERY 4 HOURS PRN
Qty: 60 TABLET | Refills: 0 | Status: SHIPPED | OUTPATIENT
Start: 2021-11-06 | End: 2021-11-11

## 2021-11-06 RX ORDER — CYCLOBENZAPRINE HCL 10 MG
10 TABLET ORAL 3 TIMES DAILY PRN
Qty: 30 TABLET | Refills: 0 | Status: SHIPPED | OUTPATIENT
Start: 2021-11-06 | End: 2022-02-23 | Stop reason: ALTCHOICE

## 2021-11-06 RX ADMIN — DOCUSATE SODIUM AND SENNOSIDES 1 TABLET: 8.6; 5 TABLET, FILM COATED ORAL at 21:01

## 2021-11-06 RX ADMIN — OXYCODONE HYDROCHLORIDE 10 MG: 5 TABLET ORAL at 18:46

## 2021-11-06 RX ADMIN — DOCUSATE SODIUM AND SENNOSIDES 1 TABLET: 8.6; 5 TABLET, FILM COATED ORAL at 08:20

## 2021-11-06 RX ADMIN — TAMSULOSIN HYDROCHLORIDE 0.4 MG: 0.4 CAPSULE ORAL at 08:20

## 2021-11-06 RX ADMIN — DEXAMETHASONE SODIUM PHOSPHATE 10 MG: 4 INJECTION, SOLUTION INTRA-ARTICULAR; INTRALESIONAL; INTRAMUSCULAR; INTRAVENOUS; SOFT TISSUE at 10:46

## 2021-11-06 RX ADMIN — POLYETHYLENE GLYCOL (3350) 17 G: 17 POWDER, FOR SOLUTION ORAL at 08:21

## 2021-11-06 RX ADMIN — OXYCODONE HYDROCHLORIDE 10 MG: 5 TABLET ORAL at 08:19

## 2021-11-06 RX ADMIN — DEXAMETHASONE SODIUM PHOSPHATE 10 MG: 4 INJECTION, SOLUTION INTRA-ARTICULAR; INTRALESIONAL; INTRAMUSCULAR; INTRAVENOUS; SOFT TISSUE at 17:14

## 2021-11-06 RX ADMIN — LATANOPROST 1 DROP: 50 SOLUTION OPHTHALMIC at 21:01

## 2021-11-06 RX ADMIN — PANTOPRAZOLE SODIUM 20 MG: 20 TABLET, DELAYED RELEASE ORAL at 08:20

## 2021-11-06 ASSESSMENT — COGNITIVE AND FUNCTIONAL STATUS - GENERAL
MOVING TO AND FROM BED TO CHAIR: 3 - A LITTLE
STANDING UP FROM CHAIR USING ARMS: 3 - A LITTLE
TOILETING: 3 - A LITTLE
EATING MEALS: 4 - NONE
DRESSING REGULAR UPPER BODY CLOTHING: 3 - A LITTLE
WALKING IN HOSPITAL ROOM: 3 - A LITTLE
AFFECT: FLAT/BLUNTED AFFECT;WFL
DRESSING REGULAR LOWER BODY CLOTHING: 2 - A LOT
CLIMB 3 TO 5 STEPS WITH RAILING: 2 - A LOT
AFFECT: WFL
HELP NEEDED FOR BATHING: 3 - A LITTLE
HELP NEEDED FOR PERSONAL GROOMING: 4 - NONE

## 2021-11-06 NOTE — PROGRESS NOTES
Patient:  Windy Kitchen  Location:  [place] [address]  MRN:  [id number]  Today's date:  11/6/2021     Pt. supine in bed on fitted sheet, incontinence pad, bed alarm on, call bell and all needs in reach, reviewed mobility safety protocol with patient.  Discussed OT Evaluative Findings and Recommendations with RN.         Ebonie is a 71 y.o. female admitted on 11/4/2021 with Spondylolisthesis of lumbar region [M43.16]  Spinal stenosis, lumbar region, with neurogenic claudication [M48.062]. Principal problem is Spinal stenosis, lumbar region, with neurogenic claudication.    Past Medical History  Ebonie has a past medical history of Basal cell carcinoma of skin, Bronchitis, Chronic kidney disease, stage II (mild), Constipation, Genital herpes simplex, Greater trochanteric bursitis of left hip, Low back pain, Menopause, Motion sickness, Osteoarthritis, Spondylolisthesis, Spondylolisthesis, lumbosacral region, and Urinary hesitancy.    History of Present Illness   71-year-old female w/ PMH as listed above presents POD #0 s/p elective L4-S1 anterior lumbar interbody fusion with posterior instrumentation and spinal fusion secondary to lumbar spondylolisthesis, symptoms have been refractory to conservative measures, including oral medications, physical therapy, and epidural injections.      OT Vitals    Date/Time Pulse BP BP Location BP Method Pt Position Observations [facility]   11/06/21 0848 84 120/59 Left upper arm Automatic Lying -- [facility]   11/06/21 0908 82 117/58 Left upper arm Automatic Lying Following Functional Bathroom Mobility with c/o slight Light-headedness [facility]      OT Pain    Date/Time Pain Type Location Rating: Rest Rating: Activity Radiation to Interventions [facility]   11/06/21 0848 Pain Assessment back 8 8 leg, right position adjusted;premedicated for activity [facility]   11/06/21 0908 Pain Reassessment back 7 7 leg, right position adjusted;pillow support provided;premedicated for activity [facility]          Prior Living  "Environment      Most Recent Value   People in Home spouse   Current Living Arrangements home   Living Environment Comment lives w/  (works from home) in senior living community. first floor set-up w/ walk-in shower, shower chair, full flight of stairs to finished basement.        Prior Level of Function      Most Recent Value   Dominant Hand right   Ambulation independent   Transferring independent   Toileting independent   Bathing assistive equipment   Dressing independent   Eating independent   Communication understands/communicates without difficulty   Prior Level of Function Comment Patient is retired , 2 dogs at home, rides horses in her spare time. drives. independent w/ ADLs. ambulation limited to approximately 1/4 mile per  due to radicular pain.   Assistive Device Currently Used at Home shower chair        Occupational Profile      Most Recent Value   Reason for Services/Referral Diminished Indep in BADL and Mobility following L4-Si Fusion   Successful Occupations Retired [employer]et[employer]   Occupational History/Life Experiences    Performance Patterns PLOF indep. all aspects of BADL and IADL   Environmental Supports and Barriers Spouse Supportive / Resides in a [facility] Community \"Help is Available\"   Patient Goals Return Home with Support of Spouse as well as Caregiver Suppport from [facility]           OT Evaluation and Treatment - 11/06/21 0846        OT Time Calculation    Start Time 0846     Stop Time 0912     Time Calculation (min) 26 min        Session Details    Document Type daily treatment/progress note     Mode of Treatment occupational therapy        General Information    Patient Profile Reviewed yes     Onset of Illness/Injury or Date of Surgery 11/04/21     Referring Physician Abad     Patient/Family/Caregiver Comments/Observations RN Cleared for OT Treatment/Patient willing 20 Minutes Post Pain Medication     General Observations of Patient " Patient received Reclined in bed, Awake     Existing Precautions/Restrictions fall;spinal;other (see comments)   Monitor VS Closely Low BP noted in chart       Cognition/Psychosocial    Affect/Mental Status (Cognition) WFL     Orientation Status (Cognition) oriented x 4     Follows Commands (Cognition) follows two-step commands;over 90% accuracy     Cognitive Function executive function deficit     Executive Function Deficit (Cognition) minimal deficit   insight related to the functional Implications of Spinal Percautions    Safety Deficit (Cognition) --   Patient verbalizing/demonstrating good safety awareness throughout session    Comment, Cognition Pleasant and Cooperative/ Invested in recovery process     Cognitive Interventions/Strategies occupation/activity based interventions;reasoning/problem-solving interventions   Relative to the functional application of Spinal Percautions       Bed Mobility    [place], Supine to Sit close supervision;safety considerations;verbal cues     Verbal Cues (Supine to Sit) technique   Patient aware of spinal precautions prompts to assure proper application log roll    [place], Sit to Supine close supervision;safety considerations;verbal cues     Verbal Cues (Sit to Supine) technique     Assistive Device bed rails;head of bed elevated     Comment (Bed Mobility) Log roll method to the left        Transfers    Transfers toilet transfer     Comment Maintained Spinal Precautions        Sit to Stand Transfer    [place], Sit to Stand Transfer close supervision;safety considerations;verbal cues     Verbal Cues maintaining precautions;technique     Assistive Device walker, front-wheeled     Comment from EOB        Stand to Sit Transfer    [place], Stand to Sit Transfer close supervision;safety considerations;verbal cues     Verbal Cues maintaining precautions;technique     Assistive Device walker, front-wheeled     Comment to EOB        Toilet Transfer    Transfer  Technique sit-stand;stand-sit     [place], Toilet Transfer close supervision;safety considerations;verbal cues     Verbal Cues maintaining precautions;proper use of assistive device;safety;technique     Assistive Device grab bars/safety frame;walker, front-wheeled     Comment Good Ecentric Control / Has Grab Bar @ Home Comfort Height Toilet Seat with Raised Seat Available        Safety Issues, Functional Mobility    Safety Issues Affecting Function (Mobility) insight into deficits/self-awareness   Related to functional Implications of Spinal Precautions    Impairments Affecting Function (Mobility) balance;endurance/activity tolerance;pain;strength     Comment, Safety Issues/Impairments (Mobility) Impulsivity Not Noted / Patient demonstrates Verbalizes Good Safety Awareness        Balance    Balance Assessment sitting static balance;sitting dynamic balance;sit to stand dynamic balance;standing static balance;standing dynamic balance     Static Sitting Balance WFL     Dynamic Sitting Balance WFL;unsupported;sitting, edge of bed     Sit to Stand Dynamic Balance mild impairment;supported   RW    Static Standing Balance mild impairment;supported   RW    Dynamic Standing Balance mild impairment;supported   RW    Balance Interventions occupation based/functional task     Comment, Balance RW / Close Supervision        Lower Body Dressing    Tasks socks     [place] maximum assist (25-49% patient effort)     Position sitting up in bed     Adaptive Equipment dressing stick;long-handled shoe horn;reacher;sock aid     Comment Demonstration of Sock Priti, Dressing Stick, LH Shoe Horn and Reacher. Patient Reports she is able to borrow LH Devices from neighbors        Grooming    Self-Performance washes, rinses and dries hands     [place] close supervision     Position sink side;supported standing     Adaptive Equipment none        Toileting    [place] close supervision;verbal cues;safety considerations      Position unsupported sitting;supported standing     Adaptive Equipment grab bar/safety frame     Comment Patient demonstrates Advancing Safety Technique        BADL Safety/Performance    Impairments, BADL Safety/Performance balance;endurance/activity tolerance;pain;strength     Skilled BADL Treatment/Intervention BADL process/adaptation training;adaptive equipment training;environmental modifications     Progress in BADL Status improvement noted;level of supervision required;use of adaptive equipment;use of compensatory strategies;effort and initiation        ADL Interventions    Self-Care (BADL) Promotion activity pacing encouraged;best position for BADL determined;close supervision for safety provided;equipment or device utilized;rest breaks provided;set-up provided        Coping    Observed Emotional State calm;cooperative;pleasant     Verbalized Emotional State acceptance;hopefulness     Trust Relationship/Rapport care explained;choices provided;questions answered;thoughts/feelings acknowledged     Family/Support Persons spouse     Involvement in Care supportive of patient;not present at bedside     Family/Support System Care self-care encouraged;support provided        AM-PAC (TM) - ADL (Current Function)    Putting on and taking off regular lower body clothing? 2 - A Lot     Bathing? 3 - A Little     Toileting? 3 - A Little     Putting on/taking off regular upper body clothing? 3 - A Little     How much help for taking care of personal grooming? 4 - None     Eating meals? 4 - None     AM-PAC (TM) ADL Score 19        Assessment/Plan (OT)    Daily Outcome Statement OT Treatment session complete AmPa Score 19 reflects need for CLose Supervision with Functional Mobility with RW, Supervision with Set-up and Stand-by Assistance for Upper Body BADL and Moderate Assistance with Lower Body BADL due to Diminished Endurance, Strength and Functional Balance coupled with Pain which is a deviation from PLOF/ Continue Acute  Care OT/ REcommend Home with Home Health Care Support Services When Medically Appropriate     Rehab Potential good, to achieve stated therapy goals     Therapy Frequency 5 times/wk     Planned Therapy Interventions BADL retraining;adaptive equipment training;occupation/activity based interventions;transfer/mobility retraining;functional balance retraining               OT Assessment/Plan      Most Recent Value   OT Recommended Discharge Disposition home with assistance, home with home health at 11/06/2021 0846   Anticipated Equipment Needs At Discharge (OT) walker, front-wheeled, shower chair, dressing equipment, bathing equipment at 11/06/2021 0846   Patient/Family Therapy Goal Statement Return Home/ ^ Pain Control for ^ Function at 11/06/2021 0846        Involvement in Care  Family/Support Persons: spouse  Involvement in Care: supportive of patient,not present at bedside           Education Documentation  Signs/Symptoms, taught by Torri Collins, OT at 11/6/2021 11:03 AM.  Learner: Patient  Readiness: Acceptance  Method: Demonstration, Explanation  Response: Needs Reinforcement, Verbalizes Understanding, Demonstrated Understanding  Comment: Role of OT   OT POC   Adaptive Strategies for BADL Engagement  Functional Implications of Spinal Precautions   Energy Conservation    Safe Mobility Strategies   Positioning for Pain Control an ^ Function          OT Goals      Most Recent Value   Bed Mobility Goal 1    Activity/Assistive Device bed mobility activities, all at 11/05/2021 1328   [place] modified independence at 11/05/2021 1328   Time Frame by discharge at 11/05/2021 1328   Progress/Outcome continuing progress toward goal, goal ongoing at 11/06/2021 0846   Transfer Goal 1    Activity/Assistive Device sit-to-stand/stand-to-sit, bed-to-chair/chair-to-bed, toilet, shower at 11/05/2021 1328   [place] modified independence at 11/05/2021 1328   Time Frame by discharge at 11/05/2021 1328    Progress/Outcome continuing progress toward goal, goal ongoing at 11/06/2021 0846   Dressing Goal 1    Activity/Adaptive Equipment dressing skills, all at 11/05/2021 1328   [place] modified independence at 11/05/2021 1328   Time Frame by discharge at 11/05/2021 1328   Progress/Outcome continuing progress toward goal, goal ongoing at 11/06/2021 0846   Toileting Goal 1    Activity/Assistive Device toileting skills, all at 11/05/2021 1328   [place] modified independence at 11/05/2021 1328   Time Frame by discharge at 11/05/2021 1328   Progress/Outcome continuing progress toward goal, goal ongoing at 11/06/2021 0846

## 2021-11-06 NOTE — PROGRESS NOTES
Hospital Medicine Service -  Daily Progress Note       SUBJECTIVE   Interval History: no events overnight, patient with some pain in Right leg/quadricep/thigh region, some same symptoms on left as well, but R>L. Eating well, + urinations on her own, feels like she still has an incompletely emptied bladder, but that it's improving. No new complaints/symptoms.        OBJECTIVE      Vital signs in last 24 hours:  Temp:  [36.8 °C (98.3 °F)-37.6 °C (99.7 °F)] 37.6 °C (99.7 °F)  Heart Rate:  [71-92] 89  Resp:  [16] 16  BP: ()/(58-71) 135/71    Intake/Output Summary (Last 24 hours) at 11/6/2021 0836  Last data filed at 11/6/2021 0700  Gross per 24 hour   Intake --   Output 5000 ml   Net -5000 ml       PHYSICAL EXAMINATION      Physical Exam  Vitals and nursing note reviewed.   Constitutional:       General: She is not in acute distress.     Appearance: Normal appearance. She is not ill-appearing.   HENT:      Head: Normocephalic and atraumatic.      Right Ear: External ear normal.      Left Ear: External ear normal.      Nose: Nose normal.   Eyes:      General: No scleral icterus.        Right eye: No discharge.         Left eye: No discharge.   Cardiovascular:      Rate and Rhythm: Normal rate and regular rhythm.   Pulmonary:      Effort: Pulmonary effort is normal.      Breath sounds: Normal breath sounds.   Abdominal:      General: Bowel sounds are normal. There is no distension.      Palpations: Abdomen is soft.   Musculoskeletal:      Cervical back: Normal range of motion. No rigidity.      Right lower leg: No edema.      Left lower leg: No edema.   Skin:     Findings: No lesion or rash.   Neurological:      General: No focal deficit present.      Mental Status: She is alert and oriented to person, place, and time.   Psychiatric:         Mood and Affect: Mood normal.         Behavior: Behavior normal.         Thought Content: Thought content normal.         Judgment: Judgment normal.            LINES,  CATHETERS, DRAINS, AIRWAYS, AND WOUNDS   Lines, Drains, and Airways:  Wounds (agree with documentation and present on admission):  Peripheral IV (Adult) 11/04/21 Left Wrist (Active)   Number of days: 2       Peripheral IV (Adult) 11/04/21 Left Antecubital (Active)   Number of days: 2       Surgical Incision Abdomen (Active)   Number of days: 2       Surgical Incision Back (Active)   Number of days: 2         Comments:      LABS / IMAGING / TELE      Labs  Lab Results   Component Value Date    WBC 12.97 (H) 11/06/2021    HGB 11.9 11/06/2021    HCT 35.8 11/06/2021    MCV 99.2 (H) 11/06/2021     11/06/2021     Lab Results   Component Value Date    GLUCOSE 128 (H) 11/06/2021    CALCIUM 8.5 (L) 11/06/2021     11/06/2021    K 4.1 11/06/2021    CO2 22 11/06/2021     11/06/2021    BUN 15 11/06/2021    CREATININE 0.9 11/06/2021         SARS-CoV-2 (COVID-19) (no units)   Date/Time Value   11/01/2021 1035 Negative     No new imaging or cardio studies.       ASSESSMENT AND PLAN      Urinary retention  Assessment & Plan  Improving, less PVR on bladder scans  Cont with hydration orally, flomax,   Monitor PVR's, Urology f/u      GERD (gastroesophageal reflux disease)  Assessment & Plan  Stable, cont with po meds, diet as tolerated.       Spondylolisthesis of lumbar region  Assessment & Plan  See lumbar stenosis section     Chronic kidney disease (CKD), stage III (moderate) (CMS/HCC)  Assessment & Plan  Very stable, Creat is normal,   Cont to monitor chem 7  Avoid nephrotoxic agents.       * Spinal stenosis, lumbar region, with neurogenic claudication  Assessment & Plan  Cont with pain control, PT/OT f/u  DVT proph  Mgmt as per Ortho.   Seems to have improved pain today in her back  Some pain in Right leg. ;           VTE Assessment: [place]    VTE Prophylaxis:  Current anticoagulants:    •None      Code Status: Full Code      Estimated Discharge Date: 11/6/2021       Disposition Planning: DC  As per  Ortho  Medically stable at this time.        Kurtis Turner MD  11/6/2021

## 2021-11-06 NOTE — PROGRESS NOTES
S:  Expected post-op surgical pain, has some R leg radicular pain.  Denies chest pain, shortness of breath.  No nausea/vomiting.    O:    Vitals:    11/05/21 1350 11/05/21 1641 11/06/21 0000 11/06/21 0731   BP: 135/61 122/68 125/60 135/71   BP Location: Left upper arm Left upper arm Right upper arm Right upper arm   Patient Position: Lying Lying Lying Lying   Pulse:  80 92 89   Resp:  16 16 16   Temp:  36.8 °C (98.3 °F) 37.4 °C (99.4 °F) 37.6 °C (99.7 °F)   TempSrc:  Oral Oral Oral   SpO2:  95% 95% 93%   Weight:       Height:           Exam:   Incision: C/D/I  Pulses palp all extremities, brisk cap refill  Calves nontender bilaterally    Neuro:   Lower Extremity Motor Function:   Right  Left    Iliopsoas  5/5  5/5    Quadriceps 5/5 5/5   Tibialis anterior  4/5  5/5    EHL  3/5  5/5    Gastroc. muscle  5/5  5/5                    A/P:   POD # 2 s/p L4-S1 ALIF  -Medically stable; hospitalist service on consult  -OOB as tolerated with PT/OT and nursing  -Pain Management with PO oxycodone and flexeril  -DVT Prophylaxis with SCDs  -Will do 24 hrs steroid to help with radiculopathy

## 2021-11-06 NOTE — PROGRESS NOTES
"Urology Progress Note    Subjective    Interval History: Voiding with improved complaint.     Objective    Vital signs in last 24 hours:  Temp:  [36.8 °C (98.3 °F)-37.6 °C (99.7 °F)] 37.6 °C (99.7 °F)  Heart Rate:  [71-92] 82  Resp:  [16] 16  BP: ()/(58-71) 117/58      Intake/Output Summary (Last 24 hours) at 11/6/2021 1205  Last data filed at 11/6/2021 0700  Gross per 24 hour   Intake --   Output 4850 ml   Net -4850 ml        Physical Exam:  Visit Vitals  BP (!) 117/58 (BP Location: Left upper arm, Patient Position: Lying)   Pulse 82   Temp 37.6 °C (99.7 °F) (Oral)   Resp 16   Ht 1.753 m (5' 9\")   Wt 66.2 kg (146 lb)   SpO2 93%   Breastfeeding No   BMI 21.56 kg/m²       General Appearance:  Alert, cooperative, no distress, appears stated age   Head:  Normocephalic, without obvious abnormality, atraumatic   Back:   No CVA tenderness   Lungs:   Respirations unlabored   Chest wall:  No tenderness or deformity       Abdomen:   Soft, non-tender, bowel sounds active all four quadrants,  no masses, no organomegaly           Extremities:  Musculoskeletal: Extremities normal, atraumatic, no cyanosis or edema  No injury or deformity       Skin: Skin color, texture, turgor normal, no rashes or lesions     Labs  Lab Results   Component Value Date    WBC 12.97 (H) 11/06/2021    HGB 11.9 11/06/2021    HCT 35.8 11/06/2021     11/06/2021    ALT 18 04/01/2019    AST 20 04/01/2019     11/06/2021    K 4.1 11/06/2021     11/06/2021    CREATININE 0.9 11/06/2021    BUN 15 11/06/2021    CO2 22 11/06/2021    TSH 3.68 04/01/2019    INR 1.0 11/01/2021         Imaging  Not applicable       Assessment & Plan     Patient Active Problem List   Diagnosis   • Chronic kidney disease (CKD), stage III (moderate) (CMS/HCC)   • Urinary hesitancy   • Low back pain   • Menopause   • Osteoarthritis   • Basal cell carcinoma of skin   • Greater trochanteric bursitis of left hip   • Fatigue   • Hot flashes   • Eustachian tube " dysfunction, bilateral   • Ear fullness, bilateral   • Acute sore throat   • Spinal stenosis, lumbar region, with neurogenic claudication   • Spondylolisthesis of lumbar region   • GERD (gastroesophageal reflux disease)   • Urinary retention       Urinary retention  Assessment & Plan  72 yo F, status post lower lumbar surgery for spinal stenosis, now with urinary retention/incomplete emptying.    Monitor postvoid residuals and straight catheter  Cont Flomax  If discharge him in it, recommend placement of indwelling [place] catheter for bladder rest with outpatient voiding trial          Expected Discharge Date:  11/6/2021  No discharge date for patient encounter.  Oneil Dawkins MD  11/6/2021

## 2021-11-06 NOTE — PLAN OF CARE
Problem: Adult Inpatient Plan of Care  Goal: Plan of Care Review  Outcome: Progressing  Flowsheets (Taken 11/6/2021 1101)  Progress: improving  Plan of Care Reviewed With: patient  Outcome Summary: OT Treatment session complete AmPac Score 19 reflects need for CLose Supervision with Functional Mobility with RW, Supervision with Set-up and Stand-by Assistance for Upper Body BADL and Moderate Assistance with Lower Body BADL due to Diminished Endurance, Strength and Functional Balance coupled with Pain which is a deviation from PLOF/ Continue Acute Care OT/ Recommend Home with Home Health Care Support Services When Medically Appropriate

## 2021-11-06 NOTE — PROGRESS NOTES
Patient: Windy Kitchen  Location:  [place] [address]  MRN:  [id number]  Today's date:  11/6/2021    P supine in bed with bed alarmed and call bell in reach. RN aware.   Patient Active Problem List   Diagnosis   • Chronic kidney disease (CKD), stage III (moderate) (CMS/HCC)   • Urinary hesitancy   • Low back pain   • Menopause   • Osteoarthritis   • Basal cell carcinoma of skin   • Greater trochanteric bursitis of left hip   • Fatigue   • Hot flashes   • Eustachian tube dysfunction, bilateral   • Ear fullness, bilateral   • Acute sore throat   • Spinal stenosis, lumbar region, with neurogenic claudication   • Spondylolisthesis of lumbar region   • GERD (gastroesophageal reflux disease)   • Urinary retention      Past Medical History:   Diagnosis Date   • Basal cell carcinoma of skin    • Bronchitis    • Chronic kidney disease, stage II (mild)    • Constipation    • Genital herpes simplex    • Greater trochanteric bursitis of left hip    • Hypotension 11/5/2021   • Low back pain    • Menopause    • Motion sickness    • Osteoarthritis    • Spondylolisthesis    • Spondylolisthesis, lumbosacral region    • Urinary hesitancy        Ebonie is a 71 y.o. female admitted on 11/4/2021 with Spondylolisthesis of lumbar region [M43.16]  Spinal stenosis, lumbar region, with neurogenic claudication [M48.062]. Principal problem is Spinal stenosis, lumbar region, with neurogenic claudication.    Past Medical History  Ebonie has a past medical history of Basal cell carcinoma of skin, Bronchitis, Chronic kidney disease, stage II (mild), Constipation, Genital herpes simplex, Greater trochanteric bursitis of left hip, Low back pain, Menopause, Motion sickness, Osteoarthritis, Spondylolisthesis, Spondylolisthesis, lumbosacral region, and Urinary hesitancy.    History of Present Illness   71-year-old female w/ PMH as listed above presents POD #0 s/p elective L4-S1 anterior lumbar interbody fusion with posterior instrumentation and  spinal fusion secondary to lumbar spondylolisthesis, symptoms have been refractory to conservative measures, including oral medications, physical therapy, and epidural injections.      PT Vitals    Date/Time Pulse HR Source Resp SpO2 O2 Therapy BP BP Location BP Method Pt Position Observations [facility]   11/06/21 1534 78 -- 16 92 % None (Room air) 113/58 Left upper arm Automatic Lying -- JMT   11/06/21 1550 82 Left Brachial -- -- None (Room air) 88/52 Left upper arm Automatic Sitting post amb with PT JANES      PT Pain    Date/Time Pain Type Location Rating: Rest Rating: Activity Radiation to Interventions [facility]   11/06/21 1534 Pain Assessment back 4 6 leg, right position adjusted [facility]   11/06/21 1550 Pain Reassessment back 4 6 leg, right position adjusted [facility]          Prior Living Environment      Most Recent Value   People in Home spouse   Current Living Arrangements home   Living Environment Comment lives w/  (works from home) in senior living community. first floor set-up w/ walk-in shower, shower chair, full flight of stairs to finished basement.        Prior Level of Function      Most Recent Value   Dominant Hand right   Ambulation independent   Transferring independent   Toileting independent   Bathing assistive equipment   Dressing independent   Eating independent   Communication understands/communicates without difficulty   Prior Level of Function Comment Patient is retired , 2 dogs at home, rides horses in her spare time. drives. independent w/ ADLs. ambulation limited to approximately 1/4 mile per  due to radicular pain.   Assistive Device Currently Used at Home shower chair           PT Evaluation and Treatment - 11/06/21 1527        PT Time Calculation    Start Time 1527     Stop Time 1552     Time Calculation (min) 25 min        Session Details    Document Type daily treatment/progress note     Mode of Treatment physical therapy        General Information    Patient Profile Reviewed  yes     Onset of Illness/Injury or Date of Surgery 11/04/21     Referring Physician Abad     Patient/Family/Caregiver Comments/Observations RN cleared for treatment     General Observations of Patient P rec'd resting in bed, able to arouse and agreeable to treatment     Existing Precautions/Restrictions fall;spinal        Cognition/Psychosocial    Affect/Mental Status (Cognition) flat/blunted affect;WFL     Orientation Status (Cognition) oriented x 4     Follows Commands (Cognition) follows multi-step commands     Cognitive Function safety deficit     Safety Deficit (Cognition) minimal deficit;insight into deficits/self-awareness     Comment, Cognition pleasant and cooperative; blunted affect; min safety cues provided throughout session        Bed Mobility    [place], Supine to Sit close supervision;verbal cues     Verbal Cues (Supine to Sit) maintaining precautions;technique     [place], Sit to Supine close supervision     Verbal Cues (Sit to Supine) maintaining precautions     Assistive Device head of bed elevated;bed rails     Comment (Bed Mobility) log roll out of bed towards L/return to bed same side. Increased time to perform log roll; bed rails utilized   encouraged to try flat bed without rails next attempt       Transfers    Transfers toilet transfer        Sit to Stand Transfer    [place], Sit to Stand Transfer close supervision;verbal cues     Verbal Cues hand placement;safety;technique     Assistive Device walker, front-wheeled     Comment from EOB/chair; pulling from walker req VCs for safety/hand placement        Stand to Sit Transfer    [place], Stand to Sit Transfer close supervision;verbal cues     Verbal Cues hand placement;safety;technique     Assistive Device walker, front-wheeled     Comment to chair/EOB        Toilet Transfer    Transfer Technique sit-stand;stand-sit     [place], Toilet Transfer close supervision;verbal cues     Verbal Cues hand placement;maintaining  precautions     Assistive Device grab bars/safety frame;walker, front-wheeled     Comment VCs to utilize grab bar rather than pull from AD        Gait Training    [place], Gait minimum assist (75% or more patient effort);verbal cues     Assistive Device walker, front-wheeled     Distance in Feet 50 feet     Pattern (Gait) step-to     Deviations/Abnormal Patterns (Gait) base of support, narrow;edilma decreased;step length decreased;stride length decreased;gait speed decreased     Comment (Gait/Stairs) step to pattern with RW, NBOS, slow steps, no overt LOB. Pt with radiating pain with increased standing/amb mild light headedness +orthostatic        Stairs Training    Comment deferred to to pain and orthostatic hypotension        Safety Issues, Functional Mobility    Safety Issues Affecting Function (Mobility) insight into deficits/self-awareness     Impairments Affecting Function (Mobility) balance;endurance/activity tolerance;pain;strength        Balance    Balance Assessment sitting static balance;sitting dynamic balance;sit to stand dynamic balance;standing static balance;system impairments     Static Sitting Balance WFL     Dynamic Sitting Balance WFL;unsupported;sitting, edge of bed     Sit to Stand Dynamic Balance mild impairment;supported     Static Standing Balance WFL;supported;standing     Dynamic Standing Balance mild impairment;supported;standing     Comment, Balance close sup-min A with RW        Motor Skills    Motor Skills functional endurance     Functional Endurance FAIR        Coping    Observed Emotional State calm;cooperative     Verbalized Emotional State acceptance     Trust Relationship/Rapport care explained;choices provided;reassurance provided;thoughts/feelings acknowledged        AM-PAC (TM) - Mobility (Current Function)    Turning from your back to your side while in a flat bed without using bedrails? 4 - None     Moving from lying on your back to sitting on the side of a flat bed  without using bedrails? 3 - A Little     Moving to and from a bed to a chair? 3 - A Little     Standing up from a chair using your arms? 3 - A Little     To walk in a hospital room? 3 - A Little     Climbing 3-5 steps with a railing? 2 - A Lot     AM-PAC (TM) Mobility Score 18        Assessment/Plan (PT)    Daily Outcome Statement Pt making slow steady progress with PT. Pt close supervision for bed mobility and transfers with improved effiency. Pt able to amb 50ft in halls with rolling walker at CS-min A prior to onset of R LE pain and light headedness. Pt continues to be limited by hypotension and pain. Rec continued acute services prior to dc home.     Rehab Potential good, to achieve stated therapy goals     Therapy Frequency 5-7 times/wk     Planned Therapy Interventions balance training;bed mobility training;gait training;strengthening;stair training;transfer training               PT Assessment/Plan      Most Recent Value   PT Recommended Discharge Disposition home with assistance, home with home health at 11/06/2021 1527   Anticipated Equipment Needs at Discharge (PT) --  [TBD pending progress] at 11/04/2021 1550   Patient/Family Therapy Goals Statement To figure out what I can and can't do. at 11/04/2021 1550                    Education Documentation  Activity, taught by Daniela Christian, PT at 11/6/2021  4:12 PM.  Learner: Patient  Readiness: Acceptance  Method: Explanation  Response: Verbalizes Understanding  Comment: role of PT, clenched fist analogy and movement post op, walking program, spinal prxns, log roll bed mobility, hand placement and safe transfer techniques, use of AD [ height of RW], energy conservation strategies, POC          PT Goals      Most Recent Value   Bed Mobility Goal 1    Activity/Assistive Device rolling to left, rolling to right, sit to supine/supine to sit at 11/04/2021 1550   [place] independent at 11/04/2021 1550   Time Frame by discharge at 11/04/2021 1550    Progress/Outcome good progress toward goal at 11/06/2021 1527   Transfer Goal 1    Activity/Assistive Device sit-to-stand/stand-to-sit, bed-to-chair/chair-to-bed at 11/04/2021 1550   [place] modified independence  [w/ least restrictive device] at 11/04/2021 1550   Time Frame by discharge at 11/04/2021 1550   Progress/Outcome good progress toward goal at 11/06/2021 1527   Gait Training Goal 1    Activity/Assistive Device gait (walking locomotion) at 11/04/2021 1550   [place] modified independence  [w/ least restrictive device] at 11/04/2021 1550   Distance 200 ft at 11/04/2021 1550   Time Frame by discharge at 11/04/2021 1550   Progress/Outcome good progress toward goal at 11/06/2021 1527   Stairs Goal 1    Activity/Assistive Device ascending stairs, descending stairs, step-to-step at 11/04/2021 1550   [place] supervision required at 11/04/2021 1550   Number of Stairs 12 at 11/04/2021 1550   Time Frame by discharge at 11/04/2021 1550   Progress/Outcome good progress toward goal at 11/06/2021 1527

## 2021-11-06 NOTE — PLAN OF CARE
Problem: Adult Inpatient Plan of Care  Goal: Plan of Care Review  Outcome: Progressing  Flowsheets (Taken 11/6/2021 9825)  Progress: improving  Plan of Care Reviewed With: patient  Outcome Summary: Pt is AAOx3, assist x1 OOB with walker.  Pt voids to the commode.  IVF continued.  Appeared to sleep well overnight.  Pt post void residuals less than 400. Bed alarm set, call bell within reach, bed in low position.   Plan of Care Review  Plan of Care Reviewed With: patient  Progress: improving  Outcome Summary: Pt is AAOx3, assist x1 OOB with walker.  Pt voids to the commode.  IVF continued.  Appeared to sleep well overnight.  Pt post void residuals less than 400. Bed alarm set, call bell within reach, bed in low position.

## 2021-11-07 VITALS
OXYGEN SATURATION: 97 % | HEART RATE: 75 BPM | WEIGHT: 146 LBS | BODY MASS INDEX: 21.62 KG/M2 | HEIGHT: 69 IN | RESPIRATION RATE: 18 BRPM | SYSTOLIC BLOOD PRESSURE: 158 MMHG | TEMPERATURE: 97.2 F | DIASTOLIC BLOOD PRESSURE: 75 MMHG

## 2021-11-07 PROCEDURE — 97535 SELF CARE MNGMENT TRAINING: CPT | Mod: GO

## 2021-11-07 PROCEDURE — 63700000 HC SELF-ADMINISTRABLE DRUG: Performed by: PHYSICIAN ASSISTANT

## 2021-11-07 PROCEDURE — 97116 GAIT TRAINING THERAPY: CPT | Mod: GP

## 2021-11-07 PROCEDURE — 97530 THERAPEUTIC ACTIVITIES: CPT | Mod: GP

## 2021-11-07 PROCEDURE — 63700000 HC SELF-ADMINISTRABLE DRUG: Performed by: UROLOGY

## 2021-11-07 PROCEDURE — 99232 SBSQ HOSP IP/OBS MODERATE 35: CPT | Performed by: INTERNAL MEDICINE

## 2021-11-07 PROCEDURE — 63600000 HC DRUGS/DETAIL CODE: Mod: JW | Performed by: ORTHOPAEDIC SURGERY

## 2021-11-07 RX ORDER — TAMSULOSIN HYDROCHLORIDE 0.4 MG/1
0.4 CAPSULE ORAL DAILY
Qty: 12 CAPSULE | Refills: 0 | Status: SHIPPED | OUTPATIENT
Start: 2021-11-08 | End: 2021-12-15 | Stop reason: ALTCHOICE

## 2021-11-07 RX ADMIN — TAMSULOSIN HYDROCHLORIDE 0.4 MG: 0.4 CAPSULE ORAL at 08:55

## 2021-11-07 RX ADMIN — DOCUSATE SODIUM AND SENNOSIDES 1 TABLET: 8.6; 5 TABLET, FILM COATED ORAL at 08:55

## 2021-11-07 RX ADMIN — OXYCODONE HYDROCHLORIDE 5 MG: 5 TABLET ORAL at 09:01

## 2021-11-07 RX ADMIN — POLYETHYLENE GLYCOL (3350) 17 G: 17 POWDER, FOR SOLUTION ORAL at 08:56

## 2021-11-07 RX ADMIN — PANTOPRAZOLE SODIUM 20 MG: 20 TABLET, DELAYED RELEASE ORAL at 08:55

## 2021-11-07 RX ADMIN — OXYCODONE HYDROCHLORIDE 5 MG: 5 TABLET ORAL at 15:31

## 2021-11-07 RX ADMIN — DEXAMETHASONE SODIUM PHOSPHATE 10 MG: 4 INJECTION, SOLUTION INTRA-ARTICULAR; INTRALESIONAL; INTRAMUSCULAR; INTRAVENOUS; SOFT TISSUE at 01:34

## 2021-11-07 ASSESSMENT — COGNITIVE AND FUNCTIONAL STATUS - GENERAL
CLIMB 3 TO 5 STEPS WITH RAILING: 3 - A LITTLE
WALKING IN HOSPITAL ROOM: 3 - A LITTLE
DRESSING REGULAR UPPER BODY CLOTHING: 4 - NONE
TOILETING: 4 - NONE
MOVING TO AND FROM BED TO CHAIR: 3 - A LITTLE
STANDING UP FROM CHAIR USING ARMS: 4 - NONE
AFFECT: WFL
HELP NEEDED FOR PERSONAL GROOMING: 4 - NONE
EATING MEALS: 4 - NONE
HELP NEEDED FOR BATHING: 3 - A LITTLE
DRESSING REGULAR LOWER BODY CLOTHING: 3 - A LITTLE

## 2021-11-07 NOTE — PROGRESS NOTES
Sp lumbar fusion,urinary retention resolved,stable for dc home,pt signed medicare letter, to transport home,

## 2021-11-07 NOTE — PROGRESS NOTES
S:  Expected post-op surgical pain.  Denies chest pain, shortness of breath.  No nausea/vomiting.    O:    Vitals:    11/06/21 1534 11/06/21 1550 11/06/21 2307 11/07/21 0716   BP: (!) 113/58 (!) 88/52 (!) 109/57 (!) 158/75   BP Location: Left upper arm Left upper arm Left upper arm Left upper arm   Patient Position: Lying Sitting Lying Lying   Pulse: 78 82 81 75   Resp: 16  16 18   Temp: 36.7 °C (98 °F)  36.9 °C (98.4 °F) 36.2 °C (97.2 °F)   TempSrc: Oral  Oral Oral   SpO2: 92%  95% 97%   Weight:       Height:           Exam:   Incision: C/D/I  Pulses palp all extremities, brisk cap refill  Calves nontender bilaterally    Neuro:   Neuro exam unchanged.    A/P:   POD # 3 s/p L4-S1 A/P fusion  -Medically stable; hospitalist service on consult  -Urology on consult.  Urinary retention has resolved.  Will continue course of 14 days of Flomax.  -OOB as tolerated with PT/OT and nursing  -Pain Management with PO oxycodone and flexeril  -DVT Prophylaxis with SCDs  -Plan for d/c to home today.

## 2021-11-07 NOTE — PROGRESS NOTES
Patient: Windy Kitchen  Location:  [place] [address]  MRN:  [id number]  Today's date:  11/7/2021     RN cleared pt for PT,  Pt left in bed, call bell within reach, nsg notified      Pat is a 71 y.o. female admitted on 11/4/2021 with Spondylolisthesis of lumbar region [M43.16]  Spinal stenosis, lumbar region, with neurogenic claudication [M48.062]. Principal problem is Spinal stenosis, lumbar region, with neurogenic claudication.    Past Medical History  Pat has a past medical history of Basal cell carcinoma of skin, Bronchitis, Chronic kidney disease, stage II (mild), Constipation, Genital herpes simplex, Greater trochanteric bursitis of left hip, Low back pain, Menopause, Motion sickness, Osteoarthritis, Spondylolisthesis, Spondylolisthesis, lumbosacral region, and Urinary hesitancy.    History of Present Illness   71-year-old female w/ PMH as listed above presents POD #0 s/p elective L4-S1 anterior lumbar interbody fusion with posterior instrumentation and spinal fusion secondary to lumbar spondylolisthesis, symptoms have been refractory to conservative measures, including oral medications, physical therapy, and epidural injections.      PT Pain    Date/Time Location Rating: Rest Rating: Activity Interventions Who   11/07/21 0937 back 2 2 position adjusted DMM          Prior Living Environment      Most Recent Value   People in Home spouse   Current Living Arrangements home   Living Environment Comment lives w/  (works from home) in senior living community. first floor set-up w/ walk-in shower, shower chair, full flight of stairs to finished basement.        Prior Level of Function      Most Recent Value   Dominant Hand right   Ambulation independent   Transferring independent   Toileting independent   Bathing assistive equipment   Dressing independent   Eating independent   Communication understands/communicates without difficulty   Prior Level of Function Comment Patient is retired  , 2 dogs at home, rides horses in her spare time. drives. independent w/ ADLs. ambulation limited to approximately 1/4 mile per  due to radicular pain.   Assistive Device Currently Used at Home shower chair           PT Evaluation and Treatment - 11/07/21 0937        PT Time Calculation    Start Time 0937     Stop Time 1008     Time Calculation (min) 31 min        Session Details    Document Type daily treatment/progress note     Mode of Treatment physical therapy        General Information    Patient Profile Reviewed yes     Onset of Illness/Injury or Date of Surgery 11/04/21     Referring Physician Abad     General Observations of Patient in bed, awake     Existing Precautions/Restrictions fall;spinal        Bed Mobility    [place], Supine to Sit modified independence     [place], Sit to Supine modified independence     Comment (Bed Mobility) L side of bed, log roll, 3 trials with VC- mod I at last trial        Transfers    Transfers car transfer        Sit to Stand Transfer    [place], Sit to Stand Transfer modified independence     Verbal Cues hand placement     Assistive Device walker, front-wheeled     Comment from EOB, 3 trials, VC for trial 1 and 2 for hand placement, mod I 3rd trial        Stand to Sit Transfer    [place], Stand to Sit Transfer modified independence     Assistive Device walker, front-wheeled     Comment EOB        Car Transfer    Comment verbal instructions provided, pt able to repeat back good understanding of technique        Gait Training    [place], Gait supervision     Assistive Device walker, front-wheeled     Distance in Feet 250 feet     Pattern (Gait) step-through     Deviations/Abnormal Patterns (Gait) base of support, narrow     Comment (Gait/Stairs) narrow JAVIER with occasional scissoring- gait pattern improved with VC to increase JAVIER        Stairs Training    [place], Stairs supervision     Assistive Device railing     Handrail  Location (Stairs) both sides     Number of Stairs 12     Ascending Stairs Technique step-to-step     Descending Stairs Technique step-to-step     Comment safe, steady with instructions provided for step to pattern        Safety Issues, Functional Mobility    Comment, Safety Issues/Impairments (Mobility) pt demonstrates good safety awareness for tx's, stairs and ambulation        Balance    Static Sitting Balance WNL;sitting, edge of bed     Dynamic Sitting Balance WFL;sitting, edge of bed     Sit to Stand Dynamic Balance WFL;supported     Static Standing Balance WFL;supported     Dynamic Standing Balance WFL;supported     Comment, Balance safe ambulation with use of RW        AM-PAC (TM) - Mobility (Current Function)    Turning from your back to your side while in a flat bed without using bedrails? 4 - None     Moving from lying on your back to sitting on the side of a flat bed without using bedrails? 4 - None     Moving to and from a bed to a chair? 3 - A Little     Standing up from a chair using your arms? 4 - None     To walk in a hospital room? 3 - A Little     Climbing 3-5 steps with a railing? 3 - A Little     AM-PAC (TM) Mobility Score 21        Assessment/Plan (PT)    Daily Outcome Statement Pt is making steady progress in PT, ambulated 250' with RW with supervsion. DC PT.               PT Assessment/Plan      Most Recent Value   PT Recommended Discharge Disposition home with assistance, home with home health at 11/07/2021 0937   Anticipated Equipment Needs at Discharge (PT) none at 11/07/2021 0937   Patient/Family Therapy Goals Statement To figure out what I can and can't do. at 11/04/2021 1550                    Education Documentation  Home Safety, taught by Dennise Cardoso, PT at 11/7/2021  1:36 PM.  Learner: Patient  Readiness: Acceptance  Method: Explanation, Demonstration  Response: Demonstrated Understanding, Verbalizes Understanding  Comment: home safety, bed mob trg, tx trg, stairs, GT, spinal  precautions, car tx (verbal instructions provided)          PT Goals      Most Recent Value   Bed Mobility Goal 1    Activity/Assistive Device rolling to left, rolling to right, sit to supine/supine to sit at 11/04/2021 1550   [place] independent at 11/04/2021 1550   Time Frame by discharge at 11/04/2021 1550   Progress/Outcome good progress toward goal at 11/06/2021 1527   Transfer Goal 1    Activity/Assistive Device sit-to-stand/stand-to-sit, bed-to-chair/chair-to-bed at 11/04/2021 1550   [place] modified independence  [w/ least restrictive device] at 11/04/2021 1550   Time Frame by discharge at 11/04/2021 1550   Progress/Outcome good progress toward goal at 11/06/2021 1527   Gait Training Goal 1    Activity/Assistive Device gait (walking locomotion) at 11/04/2021 1550   [place] modified independence  [w/ least restrictive device] at 11/04/2021 1550   Distance 200 ft at 11/04/2021 1550   Time Frame by discharge at 11/04/2021 1550   Progress/Outcome good progress toward goal at 11/06/2021 1527   Stairs Goal 1    Activity/Assistive Device ascending stairs, descending stairs, step-to-step at 11/04/2021 1550   [place] supervision required at 11/04/2021 1550   Number of Stairs 12 at 11/04/2021 1550   Time Frame by discharge at 11/04/2021 1550   Progress/Outcome good progress toward goal at 11/06/2021 1527

## 2021-11-07 NOTE — PROGRESS NOTES
"Urology Progress Note    Subjective    Interval History:Voiding with less inc emptying.     Objective    Vital signs in last 24 hours:  Temp:  [36.2 °C (97.2 °F)-36.9 °C (98.4 °F)] 36.2 °C (97.2 °F)  Heart Rate:  [75-82] 75  Resp:  [16-18] 18  BP: ()/(52-75) 158/75      Intake/Output Summary (Last 24 hours) at 11/7/2021 0921  Last data filed at 11/6/2021 2017  Gross per 24 hour   Intake --   Output 750 ml   Net -750 ml        Physical Exam:  Visit Vitals  BP (!) 158/75 (BP Location: Left upper arm, Patient Position: Lying)   Pulse 75   Temp 36.2 °C (97.2 °F) (Oral)   Resp 18   Ht 1.753 m (5' 9\")   Wt 66.2 kg (146 lb)   SpO2 97%   Breastfeeding No   BMI 21.56 kg/m²       General Appearance:  Alert, cooperative, no distress, appears stated age   Head:  Normocephalic, without obvious abnormality, atraumatic   Back:   No CVA tenderness   Lungs:   Respirations unlabored   Chest wall:  No tenderness or deformity       Abdomen:   Soft, non-tender, bowel sounds active all four quadrants,  no masses, no organomegaly           Extremities:  Musculoskeletal: Extremities normal, atraumatic, no cyanosis or edema  No injury or deformity       Skin: Skin color, texture, turgor normal, no rashes or lesions     Labs  Lab Results   Component Value Date    WBC 12.97 (H) 11/06/2021    HGB 11.9 11/06/2021    HCT 35.8 11/06/2021     11/06/2021    ALT 18 04/01/2019    AST 20 04/01/2019     11/06/2021    K 4.1 11/06/2021     11/06/2021    CREATININE 0.9 11/06/2021    BUN 15 11/06/2021    CO2 22 11/06/2021    TSH 3.68 04/01/2019    INR 1.0 11/01/2021         Imaging  Not applicable       Assessment & Plan     Patient Active Problem List   Diagnosis   • Chronic kidney disease (CKD), stage III (moderate) (CMS/HCC)   • Urinary hesitancy   • Low back pain   • Menopause   • Osteoarthritis   • Basal cell carcinoma of skin   • Greater trochanteric bursitis of left hip   • Fatigue   • Hot flashes   • Eustachian tube " dysfunction, bilateral   • Ear fullness, bilateral   • Acute sore throat   • Spinal stenosis, lumbar region, with neurogenic claudication   • Spondylolisthesis of lumbar region   • GERD (gastroesophageal reflux disease)   • Urinary retention       Urinary retention  Assessment & Plan  72 yo F, status post lower lumbar surgery for spinal stenosis, now with urinary retention/incomplete emptying.  This has improved.    Rec:  Cont Flomax x 2 weeks  Ok for d/c from  standpoint          Expected Discharge Date:  11/6/2021  No discharge date for patient encounter.  Oneil Dawkins MD  11/7/2021

## 2021-11-07 NOTE — PROGRESS NOTES
Hospital Medicine Service -  Daily Progress Note       SUBJECTIVE   Interval History: no events overnight, pain is well controlled, Less leg pain today. + OOB with PT/OT, doing well. Eating well,no CP, SOB, abd pain, N/V/D/C. ROS negative. Eager to go home.   Urinating more and easier now.        OBJECTIVE      Vital signs in last 24 hours:  Temp:  [36.2 °C (97.2 °F)-36.9 °C (98.4 °F)] 36.2 °C (97.2 °F)  Heart Rate:  [75-82] 75  Resp:  [16-18] 18  BP: ()/(52-75) 158/75    Intake/Output Summary (Last 24 hours) at 11/7/2021 1200  Last data filed at 11/6/2021 2017  Gross per 24 hour   Intake --   Output 750 ml   Net -750 ml       PHYSICAL EXAMINATION      Physical Exam  Vitals and nursing note reviewed.   Constitutional:       General: She is not in acute distress.     Appearance: Normal appearance. She is not ill-appearing.   HENT:      Head: Normocephalic and atraumatic.      Right Ear: External ear normal.      Left Ear: External ear normal.      Nose: Nose normal.   Eyes:      General: No scleral icterus.        Right eye: No discharge.         Left eye: No discharge.   Cardiovascular:      Rate and Rhythm: Normal rate and regular rhythm.      Pulses: Normal pulses.      Heart sounds: Normal heart sounds.   Pulmonary:      Effort: Pulmonary effort is normal.      Breath sounds: Normal breath sounds.   Abdominal:      General: Bowel sounds are normal. There is no distension.      Palpations: Abdomen is soft.   Musculoskeletal:      Cervical back: Normal range of motion. No rigidity.      Right lower leg: No edema.      Left lower leg: No edema.   Skin:     Findings: No lesion or rash.   Neurological:      Mental Status: She is alert and oriented to person, place, and time.      Motor: Weakness present.   Psychiatric:         Mood and Affect: Mood normal.         Behavior: Behavior normal.            LINES, CATHETERS, DRAINS, AIRWAYS, AND WOUNDS   Lines, Drains, and Airways:  Wounds (agree with  documentation and present on admission):  Peripheral IV (Adult) 11/04/21 Left Wrist (Active)   Number of days: 3       Peripheral IV (Adult) 11/04/21 Left Antecubital (Active)   Number of days: 3       Surgical Incision Abdomen (Active)   Number of days: 3       Surgical Incision Back (Active)   Number of days: 3         Comments:      LABS / IMAGING / TELE      Labs  Lab Results   Component Value Date    WBC 12.97 (H) 11/06/2021    HGB 11.9 11/06/2021    HCT 35.8 11/06/2021    MCV 99.2 (H) 11/06/2021     11/06/2021     Lab Results   Component Value Date    GLUCOSE 128 (H) 11/06/2021    CALCIUM 8.5 (L) 11/06/2021     11/06/2021    K 4.1 11/06/2021    CO2 22 11/06/2021     11/06/2021    BUN 15 11/06/2021    CREATININE 0.9 11/06/2021         SARS-CoV-2 (COVID-19) (no units)   Date/Time Value   11/01/2021 1035 Negative     No new imaging or cardio studies.       ASSESSMENT AND PLAN      Urinary retention  Assessment & Plan  Improved overall  Cont with flomax x 2 weeks  Urology input noted, f/u as outpt.         GERD (gastroesophageal reflux disease)  Assessment & Plan  Stable, cont with po meds, diet as tolerated.        Chronic kidney disease (CKD), stage III (moderate) (CMS/HCC)  Assessment & Plan  Very stable, Creat is normal,   Cont to monitor chem 7  Avoid nephrotoxic agents.        * Spinal stenosis, lumbar region, with neurogenic claudication  Assessment & Plan  Pain control, doing well with PT/OT  Ortho f/u           VTE Assessment: [place]    VTE Prophylaxis:  Current anticoagulants:    •None      Code Status: Full Code      Estimated Discharge Date: 11/6/2021       Disposition Planning: DC to home today     Kutris Turner MD  11/7/2021

## 2021-11-07 NOTE — PROGRESS NOTES
Patient:  Windy Kitchen  Location:  [place] [address]  MRN:  [id number]  Today's date:  11/7/2021     Pt. supine in bed on fitted sheet, incontinence pad, bed alarm on, call bell and all needs in reach, reviewed mobility safety protocol with patient.  Discussed OT Treatment Findings and Recommendations with RN.         Ebonie is a 71 y.o. female admitted on 11/4/2021 with Spondylolisthesis of lumbar region [M43.16]  Spinal stenosis, lumbar region, with neurogenic claudication [M48.062]. Principal problem is Spinal stenosis, lumbar region, with neurogenic claudication.    Past Medical History  Ebonie has a past medical history of Basal cell carcinoma of skin, Bronchitis, Chronic kidney disease, stage II (mild), Constipation, Genital herpes simplex, Greater trochanteric bursitis of left hip, Low back pain, Menopause, Motion sickness, Osteoarthritis, Spondylolisthesis, Spondylolisthesis, lumbosacral region, and Urinary hesitancy.    History of Present Illness   71-year-old female w/ PMH as listed above presents POD #0 s/p elective L4-S1 anterior lumbar interbody fusion with posterior instrumentation and spinal fusion secondary to lumbar spondylolisthesis, symptoms have been refractory to conservative measures, including oral medications, physical therapy, and epidural injections.      OT Vitals    Date/Time Observations [facility]   11/07/21 1216 Declined BP and other VS MKF      OT Pain    Date/Time Pain Type Rating: Rest Rating: Activity [facility]   11/07/21 1216 Pain Assessment 0 - no pain 2 - mild pain MKF        Patient Declined Opportunities for Vital Sign Monitoring At this time     Prior Living Environment      Most Recent Value   People in Home spouse   Current Living Arrangements home   Living Environment Comment lives w/  (works from home) in senior living community. first floor set-up w/ walk-in shower, shower chair, full flight of stairs to finished basement.        Prior Level of Function      Most Recent  Value   Dominant Hand right   Ambulation independent   Transferring independent   Toileting independent   Bathing assistive equipment   Dressing independent   Eating independent   Communication understands/communicates without difficulty   Prior Level of Function Comment Patient is retired , 2 dogs at home, rides horses in her spare time. drives. independent w/ ADLs. ambulation limited to approximately 1/4 mile per  due to radicular pain.   Assistive Device Currently Used at Home shower chair        Occupational Profile      Most Recent Value   Reason for Services/Referral Diminished Indep in BADL and Mobility   Successful Occupations Retired Vet   Occupational History/Life Experiences    Performance Patterns PLOF Indep in  BADL and Mobility   Environmental Supports and Barriers Supportive Spouse   Patient Goals Return Home Today           OT Evaluation and Treatment - 11/07/21 1214        OT Time Calculation    Start Time 1214     Stop Time 1228     Time Calculation (min) 14 min        Session Details    Document Type daily treatment/progress note     Mode of Treatment occupational therapy        General Information    Patient Profile Reviewed yes     Onset of Illness/Injury or Date of Surgery 11/04/21     Referring Physician [place]o     Patient/Family/Caregiver Comments/Observations RN cleared for requested session re: Showering     General Observations of Patient Patient received reclined in bed attended by friend     Existing Precautions/Restrictions fall;spinal        Cognition/Psychosocial    Affect/Mental Status (Cognition) WFL     Orientation Status (Cognition) oriented x 4     Follows Commands (Cognition) follows multi-step commands;over 90% accuracy     Cognitive Function WFL     Comment, Executive Function Verbalizes a good understanding of functional Applicatio of spinal precautions     Comment, Cognition Pleasant / Dismissive Given Understanding of Spinal Precautions      "Cognitive Interventions/Strategies occupation/activity based interventions        Transfers    Comment Declined Shower Transfer as Offered \"I will be fine\" Reviewed Safe Shower Set-up (Non Slip Andrew Shower Chair with HAnd Held Shower Head in Stall Shower with Grab Bar Recommend Supervision for Shower Transfer        Toilet Transfer    Comment Declined        Safety Issues, Functional Mobility    Impairments Affecting Function (Mobility) balance;endurance/activity tolerance;pain;strength     Comment, Safety Issues/Impairments (Mobility) Patient declined opportinites to demonstrate safe  mobility techniques Reports a good understanding of mobility sequence.        Balance    Static Sitting Balance WFL     Dynamic Sitting Balance WFL        Bathing    Comment Provided Instruction related to compensatory Showering techniques / Use of LH sponge (Adaption of LH Sponge Bending Handle) Use of Shower Chair Non Slip Andrew and Grab Bar / Assure Shower Set-up of personal hygiene supplies are above waist height for ^ Access / Patient repeated an understanding of All instructions however declined real time demonstration in Bathroom. \"I'll be fine my shower at home is larger than the one here\"        Lower Body Dressing    Comment Reviewed available LH devices for LE Dressing as well as Reacher Patient expressed understanding with no further need for Instruction        BADL Safety/Performance    Impairments, BADL Safety/Performance balance;endurance/activity tolerance;pain;strength     Skilled BADL Treatment/Intervention BADL process/adaptation training;environmental modifications;adaptive equipment training     Progress in BADL Status improvement noted;independence level;level of supervision required   Supervision for Showering       ADL Interventions    Self-Care (BADL) Promotion close supervision for safety provided;equipment or device utilized        Coping    Observed Emotional State calm;cooperative;pleasant     Verbalized " Emotional State acceptance;hopefulness     Trust Relationship/Rapport care explained;choices provided;questions answered;questions encouraged     Family/Support Persons friend     Involvement in Care at bedside;supportive of patient     Family/Support System Care self-care encouraged        AM-PAC (TM) - ADL (Current Function)    Putting on and taking off regular lower body clothing? 3 - A Little     Bathing? 3 - A Little     Toileting? 4 - None     Putting on/taking off regular upper body clothing? 4 - None     How much help for taking care of personal grooming? 4 - None     Eating meals? 4 - None     AM-PAC (TM) ADL Score 22        Assessment/Plan (OT)    Daily Outcome Statement OT Treatment [facility] Score 22 reflects Supervision Level for Showering and Lower Extremity Dressing (Stand-by Assist) due to Diminished Endurance and Strength with Mild Functional Balance Compromise Patient declined opportunities for Real Time demonstration practice of showering / Continue Acute Care OT/ Recommend Home with Home Care Services     Rehab Potential good, to achieve stated therapy goals     Therapy Frequency 3 times/wk     Planned Therapy Interventions adaptive equipment training;BADL retraining;transfer/mobility retraining               OT Assessment/Plan      Most Recent Value   OT Recommended Discharge Disposition home with assistance, home with home health at 11/07/2021 1214   Anticipated Equipment Needs At Discharge (OT) walker, front-wheeled, shower chair, bathing equipment, dressing equipment at 11/07/2021 1214   Patient/Family Therapy Goal Statement Return home today at 11/07/2021 1214        Involvement in Care  Family/Support Persons: friend  Involvement in Care: at bedside,supportive of patient           Education Documentation  Signs/Symptoms, taught by Torri Collins, OT at 11/7/2021  1:39 PM.  Learner: Patient  Readiness: Acceptance  Method: Explanation, Demonstration  Response: Needs Reinforcement,  Verbalizes Understanding  Comment: OT POC   Adaptive Strategies for Shower Safety   Functional Implications of Spinal Precautions When Showering   Environmental Adaptations for Safety At Home Shower Set-up          OT Goals      Most Recent Value   Bed Mobility Goal 1    Activity/Assistive Device bed mobility activities, all at 11/05/2021 1328   [place] modified independence at 11/05/2021 1328   Time Frame by discharge at 11/05/2021 1328   Progress/Outcome goal met at 11/07/2021 1214   Transfer Goal 1    Activity/Assistive Device sit-to-stand/stand-to-sit, bed-to-chair/chair-to-bed, toilet, shower at 11/05/2021 1328   [place] modified independence at 11/05/2021 1328   Time Frame by discharge at 11/05/2021 1328   Progress/Outcome continuing progress toward goal, goal ongoing  [Shower Transfer Supervision/ Mod I for Toilet Transfer and other mobiity] at 11/07/2021 1214   Dressing Goal 1    Activity/Adaptive Equipment dressing skills, all at 11/05/2021 1328   [place] modified independence at 11/05/2021 1328   Time Frame by discharge at 11/05/2021 1328   Progress/Outcome continuing progress toward goal, goal ongoing  [Mod I for Upper Body Stand-by Assist for Lower Body With LH Devices] at 11/07/2021 1214   Toileting Goal 1    Activity/Assistive Device toileting skills, all at 11/05/2021 1328   [place] modified independence at 11/05/2021 1328   Time Frame by discharge at 11/05/2021 1328   Progress/Outcome goal met at 11/07/2021 1214

## 2021-11-07 NOTE — DISCHARGE SUMMARY
Ortho Discharge Summary    Admitting Provider: Emilio Abad MD  Discharge Provider: Emilio Abad,*  Primary Care Physician at Discharge: Francine Flores [phone]     Admission Date: 11/4/2021     Discharge Date: 11/7/2021    Primary Discharge Diagnosis  Spinal stenosis, lumbar region, with neurogenic claudication    Secondary Discharge Diagnosis  none    Discharge Disposition  Home   Code Status at Discharge: Full Code    Discharge Medications     Medication List      START taking these medications    cyclobenzaprine 10 mg tablet  Commonly known as: FLEXERIL  Take 1 tablet (10 mg total) by mouth 3 (three) times a day as needed for muscle spasms for up to 14 days.  Dose: 10 mg     oxyCODONE 5 mg immediate release tablet  Commonly known as: ROXICODONE  Take 1-2 tablets (5-10 mg total) by mouth every 4 (four) hours as needed (moderate pain) for up to 5 days.  Dose: 5-10 mg     tamsulosin 0.4 mg capsule  Commonly known as: FLOMAX  Start taking on: November 8, 2021  Take 1 capsule (0.4 mg total) by mouth daily for 12 days.  Dose: 0.4 mg        CONTINUE taking these medications    bimatoprost 0.03 % ophthalmic solution  Commonly known as: LATISSE  Administer 1 application into both eyes once daily.  Dose: 1 application     calcium carbonate-vitamin D3 500 mg(1,250mg) -400 unit chewable tablet  Take 1 tablet by mouth once daily.  Dose: 1 tablet     omeprazole 20 mg capsule  Commonly known as: PriLOSEC  Take by mouth once daily.     polyethylene glycol 17 gram packet  Commonly known as: MIRALAX  Take 17 g by mouth daily.  Dose: 17 g     valACYclovir 500 mg tablet  Commonly known as: VALTREX  Take by mouth as needed.        STOP taking these medications    naproxen 500 mg tablet  Commonly known as: NAPROSYN            Active Issues Requiring Follow-up  Issue: N/A   Responsible Individual: N/A  What is Needed: N/A  Follow-up Appointments Arranged: We will contact patient next week to schedule  follow up appointment.    Outpatient Follow-Up  Encounter Information    This patient does not currently have any appointments scheduled.           Test Results Pending at Discharge      DETAILS OF HOSPITAL STAY    Presenting Problem/History of Present Illness  Spondylolisthesis of lumbar region [M43.16]  Spinal stenosis, lumbar region, with neurogenic claudication [M48.062]      Hospital Course  Patient underwent L4-S1 ALIF with PSF on 11/4/2021. The procedure was performed without incident.  Post operatively the patient was admitted to the hospital floor.  While an inpatient, they were evaluated by physical and occupational therapy and was deemed appropriate for discharge.  The patient was discharged to home on POD #3    Operative Procedures Performed  Procedure(s):  L4-S1 anterior lumbar interbody fusion (ALIF) with posterior instrumentation and fusion with iFactor.  Consults: urology, hospitalist

## 2021-11-07 NOTE — PLAN OF CARE
Problem: Adult Inpatient Plan of Care  Goal: Plan of Care Review  Outcome: Progressing  Flowsheets (Taken 11/7/2021 2773)  Progress: improving  Plan of Care Reviewed With: patient  Outcome Summary: OT Treatment [facility] Score 22 reflects Supervision Level for Showering and Lower Extremity Dressing (Stand-by Assist) due to Diminished Endurance and Strength with Mild Functional Balance Compromise Patient declined opportunities for Real Time demonstration practice of showering / Continue Acute Care OT/ Recommend Home with Home Care Services

## 2021-11-12 ENCOUNTER — TELEPHONE (OUTPATIENT)
Dept: SURGERY | Facility: CLINIC | Age: 71
End: 2021-11-12

## 2021-11-12 DIAGNOSIS — M43.17 SPONDYLOLISTHESIS, LUMBOSACRAL REGION: Primary | ICD-10-CM

## 2021-11-12 DIAGNOSIS — Z98.1 STATUS POST LUMBAR SPINAL FUSION: ICD-10-CM

## 2021-11-12 DIAGNOSIS — M48.062 SPINAL STENOSIS, LUMBAR REGION, WITH NEUROGENIC CLAUDICATION: ICD-10-CM

## 2021-11-12 NOTE — TELEPHONE ENCOUNTER
Spoke with patient. She is doing well since surgery.  Leg pain is better than it was in the hospital. Taking 10mg oxycodone in the AM and 5mg in the afternoon.  Recommended to start taking tylenol along with the oxycodone.  She will call us back with the fax number for the PT office.  Explained post-op xray and scheduled postop appointment.

## 2021-11-13 RX ORDER — METHYLPREDNISOLONE 4 MG/1
TABLET ORAL
Qty: 21 TABLET | Refills: 0 | Status: SHIPPED | OUTPATIENT
Start: 2021-11-13 | End: 2021-12-15 | Stop reason: ALTCHOICE

## 2021-11-13 NOTE — PROGRESS NOTES
She was doing very well but experienced a bout of sciatica this afternoon.  I prescribed a medrol dose pack.  We discussed the potential role of an epidural if her symptoms continue after the medrol dose pack.

## 2021-11-15 ENCOUNTER — TELEPHONE (OUTPATIENT)
Dept: SURGERY | Facility: CLINIC | Age: 71
End: 2021-11-15

## 2021-11-15 NOTE — TELEPHONE ENCOUNTER
[facility] called states they need a new script sent over they can not read the one that's was sent the writting is overlapping. Please fax to [phone]

## 2021-11-17 ENCOUNTER — TELEPHONE (OUTPATIENT)
Dept: SURGERY | Facility: CLINIC | Age: 71
End: 2021-11-17

## 2021-11-17 RX ORDER — GABAPENTIN 300 MG/1
300 CAPSULE ORAL DAILY
Qty: 30 CAPSULE | Refills: 0 | Status: SHIPPED | OUTPATIENT
Start: 2021-11-17 | End: 2022-02-23 | Stop reason: ALTCHOICE

## 2021-11-17 NOTE — TELEPHONE ENCOUNTER
Spoke with patient.  Left leg pain (back to buttock to knee and foot) starting this morning. Worse when lying down and sitting.  Better with standing.  Took oxycodone with some relief but nothing profound.  Discussed reaching out to PT so that she can get started with that.  Will try gabapentin.  If pain is not better, she understands to reach out to Dr. Silverman for an injection.  If she decides to move forward with an injection, she will let us know so that we can send him info.

## 2021-11-17 NOTE — TELEPHONE ENCOUNTER
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  At 6 am this morning, i got back in bed from using the toilet and began having a severe dull pain in my left leg -- worst in my butt and going down my leg.  It was 8-9/10.  I got up, vertical was slightly better than sitting or laying down.  I took 10 mg oxycodone - have had none for 3 days - and started slowly walking.  The oxy has kicked in so the pain is now 8/10 but sitting or laying down is very uncomfortable .  I assume it's all part of my body's response to the physical changes but till today my pain has been so tolerable that I quit the oxy 3 days ago.       I would appreciate a call today -- what do you think and what should I do -- oxy?  It has been difficult to sit long enough to write this message  ThanksEbonie

## 2021-11-19 ENCOUNTER — TELEPHONE (OUTPATIENT)
Dept: SURGERY | Facility: CLINIC | Age: 71
End: 2021-11-19

## 2021-11-19 NOTE — TELEPHONE ENCOUNTER
Spoke with patient.  The severe leg pain from a few days ago is gone. She continues with left leg radicular symptoms that have varied in severity over the last few days.  Today her pain is 6/10 which she reports is tolerable. She slept through the night - she did take one tablet of gabapentin - unsure if this is why she slept or not.  She will be starting physical therapy a week from Monday.  She will continue to monitor her symptoms and understands that if her pain is severe, she can reach out to Dr. Silverman's office for an injection.

## 2021-12-15 ENCOUNTER — HOSPITAL ENCOUNTER (OUTPATIENT)
Dept: RADIOLOGY | Age: 71
Discharge: HOME | End: 2021-12-15
Attending: PHYSICIAN ASSISTANT
Payer: MEDICARE

## 2021-12-15 ENCOUNTER — OFFICE VISIT (OUTPATIENT)
Dept: SURGERY | Facility: CLINIC | Age: 71
End: 2021-12-15
Payer: MEDICARE

## 2021-12-15 VITALS
OXYGEN SATURATION: 98 % | RESPIRATION RATE: 18 BRPM | HEART RATE: 77 BPM | DIASTOLIC BLOOD PRESSURE: 82 MMHG | SYSTOLIC BLOOD PRESSURE: 144 MMHG | WEIGHT: 144 LBS | BODY MASS INDEX: 21.33 KG/M2 | HEIGHT: 69 IN

## 2021-12-15 DIAGNOSIS — M41.50 DEGENERATIVE SCOLIOSIS IN ADULT PATIENT: Primary | ICD-10-CM

## 2021-12-15 DIAGNOSIS — Z98.1 STATUS POST LUMBAR SPINAL FUSION: ICD-10-CM

## 2021-12-15 DIAGNOSIS — M43.17 SPONDYLOLISTHESIS, LUMBOSACRAL REGION: ICD-10-CM

## 2021-12-15 PROCEDURE — 99024 POSTOP FOLLOW-UP VISIT: CPT | Performed by: PHYSICIAN ASSISTANT

## 2021-12-15 PROCEDURE — 72100 X-RAY EXAM L-S SPINE 2/3 VWS: CPT

## 2021-12-15 NOTE — PROGRESS NOTES
Windy Kitchen presents today for follow up. She is approximately 6 weeks status post L4-S1 anterior lumbar interbody fusion with posterior instrumentation and fusion.  She is doing well since surgery. She reports resolution of her pre-operative right leg pain.  She notes improvement in her left side post-op radiculitis as well.  She has noticed a new pain in her left low back and buttock.  It is worse with ambulation and causes her to limp.  She uses a cane which is helpful.  She has been attending physical therapy for a week.  She is happy with he progress thus far.      Neurologic Exam  Lower Extremity Motor Function    Right  Left    Iliopsoas  5/5  5/5    Quadriceps 5/5 5/5   Tibialis anterior  5/5  5/5    EHL  4/5  5/5    Gastroc. muscle  5/5  5/5      Incisions: well-healed; small scab at the superior end of the right low back incision    Imaging: I have personally reviewed the images and these are my findings:  XR lumbar (12/15/2021): AP and lateral images of the lumbar spine reveal stable interbody device at L4/L5 and L5/S1 with posterior instrumentation bilaterally from L4 to S1.  There is a degenerative scoliosis above her fusion. There is no evidence of fracture or osseous destructive lesions.    Assessment/Plan:    Ms. Kitchen presents today for follow-up.  She is approximately 6 weeks status post L4-S1 anterior lumbar interbody fusion with posterior instrumentation and fusion.  She is doing well.  She reports resolution of her pre-operative right leg pain.  She experienced severe radicular pain in her left leg following surgery, but this has significant improved.  She reports left sided low back and buttock pain that is worse with walking.  She is using a cane because the pain is causing her to limp.  She is tender over the sacroiliac joint.  I explained that her pain is due to SI joint inflammation, which can occur after a lumbar fusion as the body adjusts to its new alignment.  This should continue to  improve with time and physical therapy.  In the event that it does not, she may want to consider an injection with Dr. Silverman.  I provided her with a temporary handicap placard form to use for the short-term while she continues to recovery from surgery.    She may continue to increase her activity as tolerated while maintaining a 40lb lifting limit.  She will refrain from soaking in any tubs, pools, or bodies of water until the small scab on her right low back incision is gone.  I anticipate that this scab will fall off in the next few weeks.  She is leaving for a 3-week vacation in about a month.  She will return to the office for follow-up after her vacation.  I ordered another x-ray for her to complete prior to that appointment.  She understands that she can reach out with any additional questions in the interim.      Faviola Dominguez PA-C

## 2022-02-15 ENCOUNTER — TRANSCRIBE ORDERS (OUTPATIENT)
Dept: SCHEDULING | Age: 72
End: 2022-02-15

## 2022-02-15 ENCOUNTER — TELEPHONE (OUTPATIENT)
Dept: SURGERY | Facility: CLINIC | Age: 72
End: 2022-02-15
Payer: MEDICARE

## 2022-02-15 DIAGNOSIS — M48.062 SPINAL STENOSIS, LUMBAR REGION WITH NEUROGENIC CLAUDICATION: Primary | ICD-10-CM

## 2022-02-15 NOTE — TELEPHONE ENCOUNTER
Called and left message on vm about rescheduling her appt. Cx 3/2 appt at pt's request, please schedule with Faviola for a 4 month post op when she calls back

## 2022-02-15 NOTE — TELEPHONE ENCOUNTER
Ebonie called to reschedule her post op. She missed a call from the practice to schedule. There were no available slots populating when trying to re-schedule her post op. Please call patient back.

## 2022-02-24 ENCOUNTER — HOSPITAL ENCOUNTER (OUTPATIENT)
Dept: RADIOLOGY | Age: 72
Discharge: HOME | End: 2022-02-24
Attending: PHYSICAL MEDICINE & REHABILITATION
Payer: MEDICARE

## 2022-02-24 DIAGNOSIS — M48.062 SPINAL STENOSIS, LUMBAR REGION WITH NEUROGENIC CLAUDICATION: ICD-10-CM

## 2022-02-24 PROCEDURE — 72131 CT LUMBAR SPINE W/O DYE: CPT

## 2022-02-24 RX ORDER — GADOBUTROL 604.72 MG/ML
6.5 INJECTION INTRAVENOUS ONCE
Status: COMPLETED | OUTPATIENT
Start: 2022-02-24 | End: 2022-02-24

## 2022-02-24 RX ADMIN — GADOBUTROL 6.5 ML: 604.72 INJECTION INTRAVENOUS at 13:55

## 2022-03-09 ENCOUNTER — OFFICE VISIT (OUTPATIENT)
Dept: SURGERY | Facility: CLINIC | Age: 72
End: 2022-03-09
Payer: MEDICARE

## 2022-03-09 DIAGNOSIS — M54.16 LUMBAR RADICULOPATHY: Primary | ICD-10-CM

## 2022-03-09 PROCEDURE — 99213 OFFICE O/P EST LOW 20 MIN: CPT | Performed by: ORTHOPAEDIC SURGERY

## 2022-03-09 RX ORDER — METHYLPREDNISOLONE 4 MG/1
TABLET ORAL
Qty: 21 TABLET | Refills: 0 | Status: SHIPPED | OUTPATIENT
Start: 2022-03-09

## 2022-03-09 NOTE — PROGRESS NOTES
Windy Kitchen presents today for follow up.  She reports that she is doing well from a pain perspective, however she has developed some weakness in the left lower extremity which requires her to walk with a cane.  Is undergone advanced imaging of the lumbar spine as well as an EMG.      Neurologic Exam  Lower Extremity Motor Function    Right  Left    Iliopsoas  5/5  5/5    Quadriceps 5/5 5/5   Tibialis anterior  5/5  5/5    EHL  5/5  5/5    Gastroc. 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  Her symptomatic preoperative right side is doing quite well.  However her left side is going through a progression of developing pain followed by weakness in the left gluteal muscles.  She also had a period of weakness in the left ankle dorsiflexors which seems to have resolved.  Anticipate that her left-sided symptoms are due to a reperfusion issue of her left severe preoperative foraminal stenosis.  She is going to use a cane in the interim until her gait normalizes.  She is also going to continue with home exercises and physical therapy.  Her CT scan and MRI demonstrate appropriate positioning of the implants and appropriate decompression of the neurologic elements  Her EMG demonstrated a resolving chronic L5 radiculopathy.  At this point we are waiting for the nerve to recover.  We will continue to monitor this.  She will follow up with me in approximately 3 to 4 months.      The above dictation was performed using Dragon dictation software.  Please excuse any typos or grammatical errors. If there are any portions of this note that are unclear, please feel free to reach out to me directly.  My office number is [phone].

## 2022-03-30 ENCOUNTER — TRANSCRIBE ORDERS (OUTPATIENT)
Dept: SCHEDULING | Age: 72
End: 2022-03-30

## 2022-03-30 DIAGNOSIS — M67.952 UNSPECIFIED DISORDER OF SYNOVIUM AND TENDON, LEFT THIGH: Primary | ICD-10-CM

## 2022-04-06 ENCOUNTER — HOSPITAL ENCOUNTER (OUTPATIENT)
Dept: RADIOLOGY | Age: 72
Discharge: HOME | End: 2022-04-06
Attending: PHYSICAL MEDICINE & REHABILITATION
Payer: MEDICARE

## 2022-04-06 DIAGNOSIS — M67.952 UNSPECIFIED DISORDER OF SYNOVIUM AND TENDON, LEFT THIGH: ICD-10-CM

## 2022-04-11 ENCOUNTER — APPOINTMENT (OUTPATIENT)
Dept: URBAN - METROPOLITAN AREA CLINIC 200 | Age: 72
Setting detail: DERMATOLOGY
End: 2022-04-19

## 2022-04-11 DIAGNOSIS — D18.0 HEMANGIOMA: ICD-10-CM

## 2022-04-11 DIAGNOSIS — L57.8 OTHER SKIN CHANGES DUE TO CHRONIC EXPOSURE TO NONIONIZING RADIATION: ICD-10-CM

## 2022-04-11 DIAGNOSIS — Z85.828 PERSONAL HISTORY OF OTHER MALIGNANT NEOPLASM OF SKIN: ICD-10-CM

## 2022-04-11 DIAGNOSIS — Z11.52 ENCOUNTER FOR SCREENING FOR COVID-19: ICD-10-CM

## 2022-04-11 DIAGNOSIS — L57.0 ACTINIC KERATOSIS: ICD-10-CM

## 2022-04-11 PROBLEM — D18.01 HEMANGIOMA OF SKIN AND SUBCUTANEOUS TISSUE: Status: ACTIVE | Noted: 2022-04-11

## 2022-04-11 PROCEDURE — OTHER SUNSCREEN RECOMMENDATIONS: OTHER

## 2022-04-11 PROCEDURE — OTHER COUNSELING: OTHER

## 2022-04-11 PROCEDURE — OTHER REASSURANCE: OTHER

## 2022-04-11 PROCEDURE — OTHER SCREENING FOR COVID-19: OTHER

## 2022-04-11 PROCEDURE — 17000 DESTRUCT PREMALG LESION: CPT

## 2022-04-11 PROCEDURE — 17003 DESTRUCT PREMALG LES 2-14: CPT

## 2022-04-11 PROCEDURE — 99213 OFFICE O/P EST LOW 20 MIN: CPT | Mod: 25

## 2022-04-11 PROCEDURE — OTHER LIQUID NITROGEN: OTHER

## 2022-04-11 ASSESSMENT — LOCATION DETAILED DESCRIPTION DERM
LOCATION DETAILED: LEFT SUPERIOR PARIETAL SCALP
LOCATION DETAILED: LEFT ANTERIOR DISTAL THIGH
LOCATION DETAILED: LEFT SUPERIOR FOREHEAD
LOCATION DETAILED: RIGHT CENTRAL EYEBROW
LOCATION DETAILED: PERIUMBILICAL SKIN
LOCATION DETAILED: LEFT MEDIAL MALAR CHEEK
LOCATION DETAILED: RIGHT SUPERIOR CENTRAL MALAR CHEEK
LOCATION DETAILED: NASAL ROOT
LOCATION DETAILED: NASAL DORSUM
LOCATION DETAILED: LEFT INFERIOR ANTERIOR NECK
LOCATION DETAILED: LEFT MEDIAL FOREHEAD
LOCATION DETAILED: LEFT UPPER CUTANEOUS LIP

## 2022-04-11 ASSESSMENT — LOCATION SIMPLE DESCRIPTION DERM
LOCATION SIMPLE: LEFT LIP
LOCATION SIMPLE: LEFT FOREHEAD
LOCATION SIMPLE: ABDOMEN
LOCATION SIMPLE: LEFT ANTERIOR NECK
LOCATION SIMPLE: LEFT CHEEK
LOCATION SIMPLE: LEFT THIGH
LOCATION SIMPLE: SCALP
LOCATION SIMPLE: NOSE
LOCATION SIMPLE: RIGHT EYEBROW
LOCATION SIMPLE: RIGHT CHEEK

## 2022-04-11 ASSESSMENT — LOCATION ZONE DERM
LOCATION ZONE: LIP
LOCATION ZONE: SCALP
LOCATION ZONE: NECK
LOCATION ZONE: LEG
LOCATION ZONE: FACE
LOCATION ZONE: NOSE
LOCATION ZONE: TRUNK

## 2022-04-11 ASSESSMENT — PAIN INTENSITY VAS: HOW INTENSE IS YOUR PAIN 0 BEING NO PAIN, 10 BEING THE MOST SEVERE PAIN POSSIBLE?: NO PAIN

## 2022-04-11 NOTE — PROCEDURE: LIQUID NITROGEN
Render Note In Bullet Format When Appropriate: No
Number Of Freeze-Thaw Cycles: 1 freeze-thaw cycle
Post-Care Instructions: I reviewed with the patient in detail post-care instructions. Patient is to wear sunprotection, and avoid picking at any of the treated lesions. Pt may apply Vaseline to crusted or scabbing areas.
Show Applicator Variable?: Yes
Detail Level: Detailed
Consent: The patient's consent was obtained including but not limited to risks of crusting, scabbing, blistering, scarring, darker or lighter pigmentary change, recurrence, incomplete removal and infection.
Duration Of Freeze Thaw-Cycle (Seconds): 1

## 2022-04-11 NOTE — PROCEDURE: REASSURANCE
Detail Level: Simple
Include Location In Plan?: Yes
Additional Note: Cautery
Hide Additional Notes?: No

## 2022-04-18 ENCOUNTER — TELEPHONE (OUTPATIENT)
Dept: SURGERY | Facility: CLINIC | Age: 72
End: 2022-04-18
Payer: MEDICARE

## 2022-09-07 ENCOUNTER — APPOINTMENT (OUTPATIENT)
Dept: URBAN - METROPOLITAN AREA CLINIC 203 | Age: 72
Setting detail: DERMATOLOGY
End: 2022-09-07

## 2022-09-07 DIAGNOSIS — L57.0 ACTINIC KERATOSIS: ICD-10-CM

## 2022-09-07 DIAGNOSIS — Z11.52 ENCOUNTER FOR SCREENING FOR COVID-19: ICD-10-CM

## 2022-09-07 PROCEDURE — 17000 DESTRUCT PREMALG LESION: CPT

## 2022-09-07 PROCEDURE — 17003 DESTRUCT PREMALG LES 2-14: CPT

## 2022-09-07 PROCEDURE — OTHER SCREENING FOR COVID-19: OTHER

## 2022-09-07 PROCEDURE — OTHER LIQUID NITROGEN: OTHER

## 2022-09-07 ASSESSMENT — LOCATION SIMPLE DESCRIPTION DERM
LOCATION SIMPLE: LEFT CHEEK
LOCATION SIMPLE: ABDOMEN
LOCATION SIMPLE: RIGHT CHEEK

## 2022-09-07 ASSESSMENT — LOCATION DETAILED DESCRIPTION DERM
LOCATION DETAILED: RIGHT MEDIAL MALAR CHEEK
LOCATION DETAILED: LEFT CENTRAL MALAR CHEEK
LOCATION DETAILED: LEFT MEDIAL MALAR CHEEK
LOCATION DETAILED: EPIGASTRIC SKIN

## 2022-09-07 ASSESSMENT — PAIN INTENSITY VAS: HOW INTENSE IS YOUR PAIN 0 BEING NO PAIN, 10 BEING THE MOST SEVERE PAIN POSSIBLE?: NO PAIN

## 2022-09-07 ASSESSMENT — LOCATION ZONE DERM
LOCATION ZONE: FACE
LOCATION ZONE: TRUNK

## 2022-09-07 NOTE — PROCEDURE: LIQUID NITROGEN
Render Note In Bullet Format When Appropriate: No
Duration Of Freeze Thaw-Cycle (Seconds): 0
Post-Care Instructions: I reviewed with the patient in detail post-care instructions. Patient is to wear sunprotection, and avoid picking at any of the treated lesions. Pt may apply Vaseline to crusted or scabbing areas.
Application Tool (Optional): Liquid Nitrogen Sprayer
Show Applicator Variable?: Yes
Consent: The patient's consent was obtained including but not limited to risks of crusting, scabbing, blistering, scarring, darker or lighter pigmentary change, recurrence, incomplete removal and infection.
Detail Level: Detailed

## 2022-10-11 ENCOUNTER — APPOINTMENT (OUTPATIENT)
Dept: URBAN - METROPOLITAN AREA CLINIC 203 | Age: 72
Setting detail: DERMATOLOGY
End: 2022-10-12

## 2022-10-11 DIAGNOSIS — L57.0 ACTINIC KERATOSIS: ICD-10-CM

## 2022-10-11 DIAGNOSIS — Z85.828 PERSONAL HISTORY OF OTHER MALIGNANT NEOPLASM OF SKIN: ICD-10-CM

## 2022-10-11 DIAGNOSIS — L57.8 OTHER SKIN CHANGES DUE TO CHRONIC EXPOSURE TO NONIONIZING RADIATION: ICD-10-CM

## 2022-10-11 DIAGNOSIS — Z11.52 ENCOUNTER FOR SCREENING FOR COVID-19: ICD-10-CM

## 2022-10-11 PROCEDURE — OTHER LIQUID NITROGEN: OTHER

## 2022-10-11 PROCEDURE — 17000 DESTRUCT PREMALG LESION: CPT

## 2022-10-11 PROCEDURE — OTHER SUNSCREEN RECOMMENDATIONS: OTHER

## 2022-10-11 PROCEDURE — 17003 DESTRUCT PREMALG LES 2-14: CPT

## 2022-10-11 PROCEDURE — 99213 OFFICE O/P EST LOW 20 MIN: CPT | Mod: 25

## 2022-10-11 PROCEDURE — OTHER SCREENING FOR COVID-19: OTHER

## 2022-10-11 PROCEDURE — OTHER COUNSELING: OTHER

## 2022-10-11 ASSESSMENT — LOCATION SIMPLE DESCRIPTION DERM
LOCATION SIMPLE: RIGHT CHEEK
LOCATION SIMPLE: LEFT BREAST
LOCATION SIMPLE: ABDOMEN
LOCATION SIMPLE: NOSE
LOCATION SIMPLE: LEFT CHEEK
LOCATION SIMPLE: POSTERIOR SCALP

## 2022-10-11 ASSESSMENT — LOCATION DETAILED DESCRIPTION DERM
LOCATION DETAILED: PERIUMBILICAL SKIN
LOCATION DETAILED: EPIGASTRIC SKIN
LOCATION DETAILED: LEFT LATERAL MALAR CHEEK
LOCATION DETAILED: LEFT CENTRAL MALAR CHEEK
LOCATION DETAILED: RIGHT CENTRAL BUCCAL CHEEK
LOCATION DETAILED: RIGHT INFERIOR CENTRAL MALAR CHEEK
LOCATION DETAILED: NASAL SUPRATIP
LOCATION DETAILED: LEFT MEDIAL BREAST 11-12:00 REGION
LOCATION DETAILED: POSTERIOR MID-PARIETAL SCALP
LOCATION DETAILED: LEFT MEDIAL BREAST 10-11:00 REGION
LOCATION DETAILED: LEFT INFERIOR MEDIAL MALAR CHEEK

## 2022-10-11 ASSESSMENT — LOCATION ZONE DERM
LOCATION ZONE: SCALP
LOCATION ZONE: NOSE
LOCATION ZONE: FACE
LOCATION ZONE: TRUNK

## 2022-10-11 ASSESSMENT — PAIN INTENSITY VAS: HOW INTENSE IS YOUR PAIN 0 BEING NO PAIN, 10 BEING THE MOST SEVERE PAIN POSSIBLE?: NO PAIN

## 2022-10-11 NOTE — PROCEDURE: LIQUID NITROGEN
Show Aperture Variable?: Yes
Consent: The patient's consent was obtained including but not limited to risks of crusting, scabbing, blistering, scarring, darker or lighter pigmentary change, recurrence, incomplete removal and infection.
Render Post-Care Instructions In Note?: no
Post-Care Instructions: I reviewed with the patient in detail post-care instructions. Patient is to wear sunprotection, and avoid picking at any of the treated lesions. Pt may apply Vaseline to crusted or scabbing areas.
Detail Level: Detailed
Duration Of Freeze Thaw-Cycle (Seconds): 0
Application Tool (Optional): Liquid Nitrogen Sprayer

## 2022-11-16 ENCOUNTER — APPOINTMENT (OUTPATIENT)
Dept: URBAN - METROPOLITAN AREA CLINIC 203 | Age: 72
Setting detail: DERMATOLOGY
End: 2022-11-28

## 2022-11-16 DIAGNOSIS — Z11.52 ENCOUNTER FOR SCREENING FOR COVID-19: ICD-10-CM

## 2022-11-16 DIAGNOSIS — D485 NEOPLASM OF UNCERTAIN BEHAVIOR OF SKIN: ICD-10-CM

## 2022-11-16 PROBLEM — D48.5 NEOPLASM OF UNCERTAIN BEHAVIOR OF SKIN: Status: ACTIVE | Noted: 2022-11-16

## 2022-11-16 PROCEDURE — OTHER MIPS QUALITY: OTHER

## 2022-11-16 PROCEDURE — OTHER BIOPSY BY SHAVE METHOD: OTHER

## 2022-11-16 PROCEDURE — 11102 TANGNTL BX SKIN SINGLE LES: CPT

## 2022-11-16 PROCEDURE — OTHER SCREENING FOR COVID-19: OTHER

## 2022-11-16 ASSESSMENT — LOCATION DETAILED DESCRIPTION DERM
LOCATION DETAILED: LEFT SOFT TRIANGLE OF THE NOSE
LOCATION DETAILED: EPIGASTRIC SKIN

## 2022-11-16 ASSESSMENT — LOCATION ZONE DERM
LOCATION ZONE: TRUNK
LOCATION ZONE: NOSE

## 2022-11-16 ASSESSMENT — LOCATION SIMPLE DESCRIPTION DERM
LOCATION SIMPLE: LEFT NOSE
LOCATION SIMPLE: ABDOMEN

## 2022-11-16 ASSESSMENT — PAIN INTENSITY VAS: HOW INTENSE IS YOUR PAIN 0 BEING NO PAIN, 10 BEING THE MOST SEVERE PAIN POSSIBLE?: NO PAIN

## 2023-03-20 ENCOUNTER — RX ONLY (RX ONLY)
Age: 73
End: 2023-03-20

## 2023-03-20 RX ORDER — BIMATOPROST 0.3 MG/ML
SOLUTION/ DROPS OPHTHALMIC
Qty: 5 | Refills: 4 | Status: ERX | COMMUNITY
Start: 2023-03-20

## 2023-04-17 ENCOUNTER — APPOINTMENT (OUTPATIENT)
Dept: URBAN - METROPOLITAN AREA CLINIC 203 | Age: 73
Setting detail: DERMATOLOGY
End: 2023-04-23

## 2023-04-17 DIAGNOSIS — D485 NEOPLASM OF UNCERTAIN BEHAVIOR OF SKIN: ICD-10-CM

## 2023-04-17 DIAGNOSIS — L57.8 OTHER SKIN CHANGES DUE TO CHRONIC EXPOSURE TO NONIONIZING RADIATION: ICD-10-CM

## 2023-04-17 DIAGNOSIS — Z85.828 PERSONAL HISTORY OF OTHER MALIGNANT NEOPLASM OF SKIN: ICD-10-CM

## 2023-04-17 DIAGNOSIS — L57.0 ACTINIC KERATOSIS: ICD-10-CM

## 2023-04-17 DIAGNOSIS — L82.0 INFLAMED SEBORRHEIC KERATOSIS: ICD-10-CM

## 2023-04-17 PROBLEM — D48.5 NEOPLASM OF UNCERTAIN BEHAVIOR OF SKIN: Status: ACTIVE | Noted: 2023-04-17

## 2023-04-17 PROCEDURE — 17000 DESTRUCT PREMALG LESION: CPT | Mod: 59

## 2023-04-17 PROCEDURE — 17003 DESTRUCT PREMALG LES 2-14: CPT | Mod: 59

## 2023-04-17 PROCEDURE — OTHER LIQUID NITROGEN: OTHER

## 2023-04-17 PROCEDURE — OTHER BIOPSY BY SHAVE METHOD: OTHER

## 2023-04-17 PROCEDURE — 17110 DESTRUCT B9 LESION 1-14: CPT

## 2023-04-17 PROCEDURE — OTHER COUNSELING: OTHER

## 2023-04-17 PROCEDURE — 99213 OFFICE O/P EST LOW 20 MIN: CPT | Mod: 25

## 2023-04-17 PROCEDURE — 11102 TANGNTL BX SKIN SINGLE LES: CPT | Mod: 59

## 2023-04-17 PROCEDURE — OTHER PHOTO-DOCUMENTATION: OTHER

## 2023-04-17 PROCEDURE — 11103 TANGNTL BX SKIN EA SEP/ADDL: CPT | Mod: 59

## 2023-04-17 PROCEDURE — OTHER SUNSCREEN RECOMMENDATIONS: OTHER

## 2023-04-17 ASSESSMENT — LOCATION DETAILED DESCRIPTION DERM
LOCATION DETAILED: LEFT LATERAL EYEBROW
LOCATION DETAILED: RIGHT INFERIOR CENTRAL MALAR CHEEK
LOCATION DETAILED: RIGHT INFERIOR MEDIAL MALAR CHEEK
LOCATION DETAILED: LEFT MEDIAL BREAST 10-11:00 REGION
LOCATION DETAILED: GLABELLA
LOCATION DETAILED: EPIGASTRIC SKIN
LOCATION DETAILED: LEFT UPPER CUTANEOUS LIP
LOCATION DETAILED: LEFT MEDIAL BREAST 11-12:00 REGION
LOCATION DETAILED: LEFT INFERIOR MEDIAL MALAR CHEEK
LOCATION DETAILED: LEFT MEDIAL FRONTAL SCALP
LOCATION DETAILED: PERIUMBILICAL SKIN
LOCATION DETAILED: RIGHT CENTRAL FRONTAL SCALP
LOCATION DETAILED: LEFT MEDIAL MALAR CHEEK
LOCATION DETAILED: LEFT SUPERIOR PARIETAL SCALP
LOCATION DETAILED: NASAL TIP
LOCATION DETAILED: LEFT POPLITEAL SKIN

## 2023-04-17 ASSESSMENT — LOCATION SIMPLE DESCRIPTION DERM
LOCATION SIMPLE: LEFT SCALP
LOCATION SIMPLE: GLABELLA
LOCATION SIMPLE: RIGHT SCALP
LOCATION SIMPLE: LEFT CHEEK
LOCATION SIMPLE: SCALP
LOCATION SIMPLE: LEFT BREAST
LOCATION SIMPLE: LEFT EYEBROW
LOCATION SIMPLE: LEFT LIP
LOCATION SIMPLE: NOSE
LOCATION SIMPLE: LEFT POPLITEAL SKIN
LOCATION SIMPLE: RIGHT CHEEK
LOCATION SIMPLE: ABDOMEN

## 2023-04-17 ASSESSMENT — LOCATION ZONE DERM
LOCATION ZONE: NOSE
LOCATION ZONE: LEG
LOCATION ZONE: TRUNK
LOCATION ZONE: FACE
LOCATION ZONE: LIP
LOCATION ZONE: SCALP

## 2023-04-17 ASSESSMENT — PAIN INTENSITY VAS: HOW INTENSE IS YOUR PAIN 0 BEING NO PAIN, 10 BEING THE MOST SEVERE PAIN POSSIBLE?: NO PAIN

## 2023-04-17 NOTE — PROCEDURE: LIQUID NITROGEN
Detail Level: Detailed
Render Post-Care Instructions In Note?: no
Number Of Freeze-Thaw Cycles: 2 freeze-thaw cycles
Post-Care Instructions: I reviewed with the patient in detail post-care instructions. Patient is to wear sunprotection, and avoid picking at any of the treated lesions. Pt may apply Vaseline to crusted or scabbing areas.
Duration Of Freeze Thaw-Cycle (Seconds): 5
Application Tool (Optional): Liquid Nitrogen Sprayer
Show Aperture Variable?: Yes
Consent: The patient's consent was obtained including but not limited to risks of crusting, scabbing, blistering, scarring, darker or lighter pigmentary change, recurrence, incomplete removal and infection.
Medical Necessity Clause: This procedure was medically necessary because the lesions that were treated were:
Medical Necessity Information: It is in your best interest to select a reason for this procedure from the list below. All of these items fulfill various CMS LCD requirements except the new and changing color options.
Spray Paint Text: The liquid nitrogen was applied to the skin utilizing a spray paint frosting technique.

## 2023-05-03 ENCOUNTER — APPOINTMENT (OUTPATIENT)
Dept: URBAN - METROPOLITAN AREA CLINIC 203 | Age: 73
Setting detail: DERMATOLOGY
End: 2023-05-07

## 2023-05-03 DIAGNOSIS — L57.0 ACTINIC KERATOSIS: ICD-10-CM

## 2023-05-03 PROCEDURE — 17003 DESTRUCT PREMALG LES 2-14: CPT

## 2023-05-03 PROCEDURE — OTHER PRESCRIPTION MEDICATION MANAGEMENT: OTHER

## 2023-05-03 PROCEDURE — OTHER LIQUID NITROGEN: OTHER

## 2023-05-03 PROCEDURE — 17000 DESTRUCT PREMALG LESION: CPT

## 2023-05-03 ASSESSMENT — LOCATION ZONE DERM
LOCATION ZONE: SCALP
LOCATION ZONE: FACE
LOCATION ZONE: NECK

## 2023-05-03 ASSESSMENT — LOCATION SIMPLE DESCRIPTION DERM
LOCATION SIMPLE: LEFT ANTERIOR NECK
LOCATION SIMPLE: LEFT SCALP
LOCATION SIMPLE: GLABELLA
LOCATION SIMPLE: NECK
LOCATION SIMPLE: SCALP

## 2023-05-03 ASSESSMENT — LOCATION DETAILED DESCRIPTION DERM
LOCATION DETAILED: LEFT CENTRAL LATERAL NECK
LOCATION DETAILED: LEFT INFERIOR ANTERIOR NECK
LOCATION DETAILED: LEFT MEDIAL FRONTAL SCALP
LOCATION DETAILED: LEFT SUPERIOR PARIETAL SCALP
LOCATION DETAILED: GLABELLA

## 2023-05-03 ASSESSMENT — PAIN INTENSITY VAS: HOW INTENSE IS YOUR PAIN 0 BEING NO PAIN, 10 BEING THE MOST SEVERE PAIN POSSIBLE?: NO PAIN

## 2023-05-03 NOTE — PROCEDURE: LIQUID NITROGEN
Render Post-Care Instructions In Note?: no
Post-Care Instructions: I reviewed with the patient in detail post-care instructions. Patient is to wear sunprotection, and avoid picking at any of the treated lesions. Pt may apply Vaseline to crusted or scabbing areas.
Application Tool (Optional): Liquid Nitrogen Sprayer
Consent: The patient's consent was obtained including but not limited to risks of crusting, scabbing, blistering, scarring, darker or lighter pigmentary change, recurrence, incomplete removal and infection.
Detail Level: Detailed
Duration Of Freeze Thaw-Cycle (Seconds): 5
Number Of Freeze-Thaw Cycles: 2 freeze-thaw cycles
Show Applicator Variable?: Yes

## 2023-05-19 ENCOUNTER — TRANSCRIBE ORDERS (OUTPATIENT)
Dept: SCHEDULING | Age: 73
End: 2023-05-19

## 2023-05-19 DIAGNOSIS — M54.16 RADICULOPATHY, LUMBAR REGION: Primary | ICD-10-CM

## 2023-05-19 DIAGNOSIS — M96.1 POSTLAMINECTOMY SYNDROME, NOT ELSEWHERE CLASSIFIED: ICD-10-CM

## 2023-06-05 ENCOUNTER — HOSPITAL ENCOUNTER (OUTPATIENT)
Dept: RADIOLOGY | Age: 73
Discharge: HOME | End: 2023-06-05
Attending: ANESTHESIOLOGY
Payer: MEDICARE

## 2023-06-05 DIAGNOSIS — M54.16 RADICULOPATHY, LUMBAR REGION: ICD-10-CM

## 2023-06-05 DIAGNOSIS — M96.1 POSTLAMINECTOMY SYNDROME, NOT ELSEWHERE CLASSIFIED: ICD-10-CM

## 2023-06-05 PROCEDURE — 72110 X-RAY EXAM L-2 SPINE 4/>VWS: CPT

## 2023-08-16 ENCOUNTER — TELEPHONE (OUTPATIENT)
Dept: SURGERY | Facility: CLINIC | Age: 73
End: 2023-08-16
Payer: MEDICARE

## 2023-08-16 NOTE — TELEPHONE ENCOUNTER
Windy called to schedule an appointment to discuss the unresolved issue that was discussed after surgery. Please call patient back and advise.

## 2023-08-23 ENCOUNTER — OFFICE VISIT (OUTPATIENT)
Dept: SURGERY | Facility: CLINIC | Age: 73
End: 2023-08-23
Payer: MEDICARE

## 2023-08-23 DIAGNOSIS — M53.3 SACROILIAC DYSFUNCTION: Primary | ICD-10-CM

## 2023-08-23 PROCEDURE — 99214 OFFICE O/P EST MOD 30 MIN: CPT | Performed by: ORTHOPAEDIC SURGERY

## 2023-08-23 NOTE — PROGRESS NOTES
Windy Kitchen presents today for follow up.  Ported that she continues to have left-sided low back pain.  She had undergone an injection of the iliolumbar ligament which provided temporary relief.  Otherwise, other treatments have not been effective.      Neurologic Exam  Lower Extremity Motor Function    Right  Left    Iliopsoas  5/5  5/5    Quadriceps 5/5 5/5   Tibialis anterior  5/5  5/5    EHL  5/5  5/5    Gastroc. muscle  5/5  5/5    Heel rise  5/5  5/5    Toe rise  5/5  5/5      Gait: uses a cane due to left sided antalgic gait.    Back: Pinpoint tenderness over the left sacroiliac joint.      Diagnoses:   Status post L4-S1 fusion  Left-sided low back pain  Tenderness over the left SI joint    Assessment/Plan:  Ms. Kitchen is a very pleasant 73-year-old female who underwent a prior L4-S1 fusion.  She did well from this.  However she has developed progressive, continuous left-sided low back pain.  Diagnosing her pain has been a challenge.  However, based on physical examination and ruling out other causes of pain, I have a high suspicion that her pain is arising from the left sacroiliac joint.  She had undergone a prior injection into the joint filled with cortisone, but this did not provide a true diagnostic test.  I would like her to repeat a left-sided SI joint injection with only local anesthetic.  If that is helpful for her, she may be a candidate for ablations of that joint or potentially an SI joint fusion.  At the very least, we would have identified her pain generator.  She is going to proceed with this.  Of note we discussed that other items of the differential diagnosis could be a potential chronic iliolumbar ligament pain or potentially an atypical left L2 or L3 radiculopathy.  These could be targets for additional pain management if the SI joint injection is not effective.  She will let us know how she does with the diagnostic block.      The above dictation was performed using Dragon dictation  software.  Please excuse any typos or grammatical errors. If there are any portions of this note that are unclear, please feel free to reach out to me directly.  My office number is [phone].

## 2023-10-16 ENCOUNTER — APPOINTMENT (OUTPATIENT)
Dept: URBAN - METROPOLITAN AREA CLINIC 203 | Age: 73
Setting detail: DERMATOLOGY
End: 2023-10-23

## 2023-10-16 DIAGNOSIS — L57.8 OTHER SKIN CHANGES DUE TO CHRONIC EXPOSURE TO NONIONIZING RADIATION: ICD-10-CM

## 2023-10-16 DIAGNOSIS — Z85.828 PERSONAL HISTORY OF OTHER MALIGNANT NEOPLASM OF SKIN: ICD-10-CM

## 2023-10-16 DIAGNOSIS — L57.0 ACTINIC KERATOSIS: ICD-10-CM

## 2023-10-16 DIAGNOSIS — L60.8 OTHER NAIL DISORDERS: ICD-10-CM

## 2023-10-16 PROCEDURE — OTHER PREMALIGNANT DESTRUCTION: OTHER

## 2023-10-16 PROCEDURE — 17003 DESTRUCT PREMALG LES 2-14: CPT

## 2023-10-16 PROCEDURE — OTHER LIQUID NITROGEN: OTHER

## 2023-10-16 PROCEDURE — OTHER COUNSELING: OTHER

## 2023-10-16 PROCEDURE — 99213 OFFICE O/P EST LOW 20 MIN: CPT | Mod: 25

## 2023-10-16 PROCEDURE — 17000 DESTRUCT PREMALG LESION: CPT

## 2023-10-16 PROCEDURE — OTHER SUNSCREEN RECOMMENDATIONS: OTHER

## 2023-10-16 ASSESSMENT — LOCATION DETAILED DESCRIPTION DERM
LOCATION DETAILED: RIGHT INFERIOR MEDIAL FOREHEAD
LOCATION DETAILED: RIGHT CENTRAL LATERAL NECK
LOCATION DETAILED: RIGHT SUPERIOR FOREHEAD
LOCATION DETAILED: RIGHT FOREHEAD
LOCATION DETAILED: LEFT MEDIAL BREAST 11-12:00 REGION
LOCATION DETAILED: EPIGASTRIC SKIN
LOCATION DETAILED: LEFT MEDIAL FRONTAL SCALP
LOCATION DETAILED: RIGHT INDEX FINGERNAIL
LOCATION DETAILED: PERIUMBILICAL SKIN
LOCATION DETAILED: RIGHT INFERIOR TEMPLE
LOCATION DETAILED: LEFT SUPERIOR LATERAL NECK
LOCATION DETAILED: RIGHT UPPER CUTANEOUS LIP
LOCATION DETAILED: LEFT MEDIAL BREAST 10-11:00 REGION
LOCATION DETAILED: RIGHT SUPERIOR LATERAL FOREHEAD
LOCATION DETAILED: NASAL ROOT

## 2023-10-16 ASSESSMENT — LOCATION ZONE DERM
LOCATION ZONE: TRUNK
LOCATION ZONE: NECK
LOCATION ZONE: FACE
LOCATION ZONE: LIP
LOCATION ZONE: FINGERNAIL
LOCATION ZONE: NOSE
LOCATION ZONE: SCALP

## 2023-10-16 ASSESSMENT — LOCATION SIMPLE DESCRIPTION DERM
LOCATION SIMPLE: RIGHT INDEX FINGERNAIL
LOCATION SIMPLE: RIGHT FOREHEAD
LOCATION SIMPLE: LEFT SCALP
LOCATION SIMPLE: ABDOMEN
LOCATION SIMPLE: LEFT BREAST
LOCATION SIMPLE: RIGHT LIP
LOCATION SIMPLE: RIGHT TEMPLE
LOCATION SIMPLE: NOSE
LOCATION SIMPLE: NECK

## 2023-10-16 ASSESSMENT — PAIN INTENSITY VAS: HOW INTENSE IS YOUR PAIN 0 BEING NO PAIN, 10 BEING THE MOST SEVERE PAIN POSSIBLE?: NO PAIN

## 2023-10-16 NOTE — PROCEDURE: PREMALIGNANT DESTRUCTION
Consent: The patient's consent was obtained including but not limited to risks of crusting, scabbing, blistering, scarring, darker or lighter pigmentary change, recurrence, incomplete removal and infection.
Post-Care Instructions: I reviewed with the patient in detail post-care instructions. Patient is to wear sunprotection, and avoid picking at any of the treated lesions. Pt may apply Vaseline to crusted or scabbing areas.
Render Post-Care In The Note: No
Method: thermocautery
Anesthesia Volume In Cc: 0
Detail Level: Detailed
Post-Procedure Care (Optional): The wound was cleaned, and a pressure dressing was applied.  The patient received detailed post-op instructions.

## 2024-03-26 ENCOUNTER — APPOINTMENT (OUTPATIENT)
Dept: URBAN - METROPOLITAN AREA CLINIC 203 | Age: 74
Setting detail: DERMATOLOGY
End: 2024-03-31

## 2024-03-26 DIAGNOSIS — L57.8 OTHER SKIN CHANGES DUE TO CHRONIC EXPOSURE TO NONIONIZING RADIATION: ICD-10-CM

## 2024-03-26 DIAGNOSIS — D485 NEOPLASM OF UNCERTAIN BEHAVIOR OF SKIN: ICD-10-CM

## 2024-03-26 DIAGNOSIS — L57.0 ACTINIC KERATOSIS: ICD-10-CM

## 2024-03-26 DIAGNOSIS — Z85.828 PERSONAL HISTORY OF OTHER MALIGNANT NEOPLASM OF SKIN: ICD-10-CM

## 2024-03-26 PROBLEM — D48.5 NEOPLASM OF UNCERTAIN BEHAVIOR OF SKIN: Status: ACTIVE | Noted: 2024-03-26

## 2024-03-26 PROCEDURE — 11102 TANGNTL BX SKIN SINGLE LES: CPT

## 2024-03-26 PROCEDURE — OTHER SUNSCREEN RECOMMENDATIONS: OTHER

## 2024-03-26 PROCEDURE — OTHER PRESCRIPTION MEDICATION MANAGEMENT: OTHER

## 2024-03-26 PROCEDURE — OTHER COUNSELING: OTHER

## 2024-03-26 PROCEDURE — 99213 OFFICE O/P EST LOW 20 MIN: CPT | Mod: 25

## 2024-03-26 PROCEDURE — OTHER BIOPSY BY SHAVE METHOD: OTHER

## 2024-03-26 PROCEDURE — OTHER PRESCRIPTION: OTHER

## 2024-03-26 RX ORDER — FLUOROURACIL 5 MG/G
CREAM TOPICAL BID
Qty: 40 | Refills: 3 | Status: ERX | COMMUNITY
Start: 2024-03-26

## 2024-03-26 ASSESSMENT — LOCATION ZONE DERM
LOCATION ZONE: NOSE
LOCATION ZONE: TRUNK
LOCATION ZONE: FACE

## 2024-03-26 ASSESSMENT — LOCATION SIMPLE DESCRIPTION DERM
LOCATION SIMPLE: LEFT FOREHEAD
LOCATION SIMPLE: LEFT BREAST
LOCATION SIMPLE: LEFT CHEEK
LOCATION SIMPLE: ABDOMEN
LOCATION SIMPLE: NOSE
LOCATION SIMPLE: RIGHT CHEEK

## 2024-03-26 ASSESSMENT — LOCATION DETAILED DESCRIPTION DERM
LOCATION DETAILED: LEFT MEDIAL BREAST 11-12:00 REGION
LOCATION DETAILED: LEFT MEDIAL BREAST 10-11:00 REGION
LOCATION DETAILED: NASAL DORSUM
LOCATION DETAILED: LEFT CENTRAL MALAR CHEEK
LOCATION DETAILED: LEFT SUPERIOR FOREHEAD
LOCATION DETAILED: EPIGASTRIC SKIN
LOCATION DETAILED: RIGHT SUPERIOR LATERAL MALAR CHEEK
LOCATION DETAILED: PERIUMBILICAL SKIN
LOCATION DETAILED: RIGHT CENTRAL MALAR CHEEK
LOCATION DETAILED: LEFT INFERIOR CENTRAL MALAR CHEEK

## 2024-03-26 NOTE — PROCEDURE: BIOPSY BY SHAVE METHOD
Detail Level: Detailed
Depth Of Biopsy: dermis
Was A Bandage Applied: Yes
Size Of Lesion In Cm: 0.4
X Size Of Lesion In Cm: 0
Biopsy Type: H and E
Biopsy Method: Dermablade
Anesthesia Type: 1% lidocaine with epinephrine
Anesthesia Volume In Cc: 0.5
Hemostasis: Drysol
Wound Care: Petrolatum
Dressing: bandage
Destruction After The Procedure: No
Type Of Destruction Used: Curettage
Curettage Text: The wound bed was treated with curettage after the biopsy was performed.
Cryotherapy Text: The wound bed was treated with cryotherapy after the biopsy was performed.
Electrodesiccation Text: The wound bed was treated with electrodesiccation after the biopsy was performed.
Electrodesiccation And Curettage Text: The wound bed was treated with electrodesiccation and curettage after the biopsy was performed.
Silver Nitrate Text: The wound bed was treated with silver nitrate after the biopsy was performed.
Lab: [id number]
Consent: Written consent was obtained and risks were reviewed including but not limited to scarring, infection, bleeding, scabbing, incomplete removal, nerve damage and allergy to anesthesia.
Post-Care Instructions: I reviewed with the patient in detail post-care instructions. Patient is to keep the biopsy site dry overnight, and then apply bacitracin twice daily until healed. Patient may apply hydrogen peroxide soaks to remove any crusting.
Notification Instructions: Patient will be notified of biopsy results. However, patient instructed to call the office if not contacted within 2 weeks.
Billing Type: Third-Party Bill
Information: Selecting Yes will display possible errors in your note based on the variables you have selected. This validation is only offered as a suggestion for you. PLEASE NOTE THAT THE VALIDATION TEXT WILL BE REMOVED WHEN YOU FINALIZE YOUR NOTE. IF YOU WANT TO FAX A PRELIMINARY NOTE YOU WILL NEED TO TOGGLE THIS TO 'NO' IF YOU DO NOT WANT IT IN YOUR FAXED NOTE.

## 2024-04-09 ENCOUNTER — RX ONLY (RX ONLY)
Age: 74
End: 2024-04-09

## 2024-04-09 RX ORDER — BIMATOPROST 0.3 MG/ML
SOLUTION/ DROPS OPHTHALMIC
Qty: 5 | Refills: 4 | Status: ERX

## 2024-07-12 ENCOUNTER — TRANSCRIBE ORDERS (OUTPATIENT)
Dept: SCHEDULING | Age: 74
End: 2024-07-12

## 2024-07-12 DIAGNOSIS — M12.811 OTHER SPECIFIC ARTHROPATHIES, NOT ELSEWHERE CLASSIFIED, RIGHT SHOULDER: Primary | ICD-10-CM

## 2024-07-16 ENCOUNTER — HOSPITAL ENCOUNTER (OUTPATIENT)
Dept: RADIOLOGY | Facility: CLINIC | Age: 74
Discharge: HOME | End: 2024-07-16
Attending: PHYSICAL MEDICINE & REHABILITATION
Payer: MEDICARE

## 2024-07-16 DIAGNOSIS — M12.811 OTHER SPECIFIC ARTHROPATHIES, NOT ELSEWHERE CLASSIFIED, RIGHT SHOULDER: ICD-10-CM

## 2024-11-20 ENCOUNTER — TRANSCRIBE ORDERS (OUTPATIENT)
Dept: SCHEDULING | Age: 74
End: 2024-11-20

## 2024-11-20 DIAGNOSIS — Z00.00 ENCOUNTER FOR GENERAL ADULT MEDICAL EXAMINATION WITHOUT ABNORMAL FINDINGS: ICD-10-CM

## 2024-11-20 DIAGNOSIS — M54.17 RADICULOPATHY, LUMBOSACRAL REGION: ICD-10-CM

## 2024-11-20 DIAGNOSIS — M47.816 SPONDYLOSIS WITHOUT MYELOPATHY OR RADICULOPATHY, LUMBAR REGION: ICD-10-CM

## 2024-11-20 DIAGNOSIS — M54.50 LOW BACK PAIN: ICD-10-CM

## 2024-11-20 DIAGNOSIS — M96.1 POSTLAMINECTOMY SYNDROME, NOT ELSEWHERE CLASSIFIED: Primary | ICD-10-CM

## 2024-12-01 ENCOUNTER — HOSPITAL ENCOUNTER (OUTPATIENT)
Dept: RADIOLOGY | Facility: HOSPITAL | Age: 74
Discharge: HOME | End: 2024-12-01
Attending: PHYSICAL MEDICINE & REHABILITATION
Payer: MEDICARE

## 2024-12-01 DIAGNOSIS — M96.1 POSTLAMINECTOMY SYNDROME, NOT ELSEWHERE CLASSIFIED: ICD-10-CM

## 2024-12-01 DIAGNOSIS — M47.816 SPONDYLOSIS WITHOUT MYELOPATHY OR RADICULOPATHY, LUMBAR REGION: ICD-10-CM

## 2024-12-01 DIAGNOSIS — M54.50 LOW BACK PAIN: ICD-10-CM

## 2024-12-01 DIAGNOSIS — M54.17 RADICULOPATHY, LUMBOSACRAL REGION: ICD-10-CM

## 2025-03-14 PROCEDURE — 99203 OFFICE O/P NEW LOW 30 MIN: CPT | Performed by: FAMILY MEDICINE

## 2025-08-03 ENCOUNTER — TELEPHONE (OUTPATIENT)
Dept: PRIMARY CARE | Facility: CLINIC | Age: 75
End: 2025-08-03
Payer: MEDICARE

## 2025-08-03 RX ORDER — PREGABALIN 50 MG/1
50 CAPSULE ORAL DAILY
Qty: 90 CAPSULE | Refills: 0 | Status: SHIPPED | OUTPATIENT
Start: 2025-08-03 | End: 2025-11-01

## (undated) DEVICE — PACK RFID LAMINECTOMY PMH

## (undated) DEVICE — DRAPE C-ARMOR

## (undated) DEVICE — DRAPE IOBAN

## (undated) DEVICE — MANIFOLD FOUR PORT NEPTUNE

## (undated) DEVICE — STAPLER SKIN

## (undated) DEVICE — COVER BACK TABLE REINFORCED

## (undated) DEVICE — BLANKET LOWER BODY

## (undated) DEVICE — TAPE DURAPORE 3IN

## (undated) DEVICE — BURR LONG MIDAS AM-8

## (undated) DEVICE — ***USE 57698*** SLEEVE FLOWTRON DVT CALF SINGLE USE

## (undated) DEVICE — SUTURE BONE WAX   W31G

## (undated) DEVICE — SUTURE VICRYL 3-0  J864D CR SH UNDYED

## (undated) DEVICE — SKIN MARKER SURGICAL

## (undated) DEVICE — SPINE TABLE COVER ULTRA COMFORT MEDIUM

## (undated) DEVICE — SECTO TRIANGL DISCTOR/480

## (undated) DEVICE — DRAPE LARGE REVERSE FOLD

## (undated) DEVICE — Device

## (undated) DEVICE — TIP BOVIE BLADE COATED INSULATED 6IN

## (undated) DEVICE — ADHESIVE SKIN DERMABOND ADVANCED 0.7ML

## (undated) DEVICE — APPLICATOR CHLORAPREP 26ML ORANGE TINT

## (undated) DEVICE — PAD HEEL/ELBOW UNIVERSAL

## (undated) DEVICE — GLOVE SZ 8.5 PROTEXIS PI

## (undated) DEVICE — FORCEP BIPOLAR NONSTICK DISP 8.5IN

## (undated) DEVICE — PACK RFID LAPAROTOMY

## (undated) DEVICE — GOWN SURGICAL REINFORCED X-LAR

## (undated) DEVICE — NEEDLE SPINAL 18G X3-1-2IN

## (undated) DEVICE — SUTURE VICRYL 2-0  J762D CR CP-2 18IN

## (undated) DEVICE — POUCH INSTRUMENT 7X11 3-4

## (undated) DEVICE — DRAPE STERI TOWEL PLASTIC 18X24

## (undated) DEVICE — TIP BOVIE BLADE COATED 6IN

## (undated) DEVICE — DRAPE C-ARM X-RAY EQUIPMENT IMAGE

## (undated) DEVICE — PARTICLES HEMOSTATIC ARISTA 3 GRAM

## (undated) DEVICE — SUTURE VLOC 3-0 90 UNDYED 1X12 P-12

## (undated) DEVICE — CONTAINER SPECIMEN 60 ML

## (undated) DEVICE — ***USE 138850*** SUTURE PDS II 1 Z371T CTX

## (undated) DEVICE — DRESSING TEGADERM 8INX12IN

## (undated) DEVICE — CORD BI-POLAR DISP STERILE

## (undated) DEVICE — TUBING SMOKE EVAC PENCIL COATED

## (undated) DEVICE — PAD GROUND ELECTROSURGICAL W/CORD

## (undated) DEVICE — SOLN IRRIG STER WATER 1000ML

## (undated) DEVICE — NEEDLE DISP HYPO 22GX1-1/2IN

## (undated) DEVICE — ***USE 134435*** SPONGE PEANUT

## (undated) DEVICE — SOLN IRRIG .9%SOD 1000ML

## (undated) DEVICE — TIP SUCTION YANKAUER

## (undated) DEVICE — ***USE 127961*** SUTURE VICRYL 0 J740D CR CT-1 18IN VIOLET